# Patient Record
Sex: FEMALE | Race: WHITE | NOT HISPANIC OR LATINO
[De-identification: names, ages, dates, MRNs, and addresses within clinical notes are randomized per-mention and may not be internally consistent; named-entity substitution may affect disease eponyms.]

---

## 2017-01-05 ENCOUNTER — OTHER (OUTPATIENT)
Age: 57
End: 2017-01-05

## 2017-01-12 ENCOUNTER — MEDICATION RENEWAL (OUTPATIENT)
Age: 57
End: 2017-01-12

## 2017-03-07 ENCOUNTER — APPOINTMENT (OUTPATIENT)
Dept: ENDOCRINOLOGY | Facility: CLINIC | Age: 57
End: 2017-03-07

## 2017-03-07 VITALS — HEIGHT: 60.8 IN | BODY MASS INDEX: 27.35 KG/M2 | WEIGHT: 143 LBS

## 2017-03-07 VITALS — DIASTOLIC BLOOD PRESSURE: 76 MMHG | SYSTOLIC BLOOD PRESSURE: 110 MMHG | HEART RATE: 103 BPM | OXYGEN SATURATION: 98 %

## 2017-03-07 VITALS — HEART RATE: 92 BPM

## 2017-03-08 ENCOUNTER — MEDICATION RENEWAL (OUTPATIENT)
Age: 57
End: 2017-03-08

## 2017-03-16 ENCOUNTER — MEDICATION RENEWAL (OUTPATIENT)
Age: 57
End: 2017-03-16

## 2017-04-03 ENCOUNTER — APPOINTMENT (OUTPATIENT)
Dept: ENDOCRINOLOGY | Facility: CLINIC | Age: 57
End: 2017-04-03

## 2017-06-09 ENCOUNTER — APPOINTMENT (OUTPATIENT)
Dept: ENDOCRINOLOGY | Facility: CLINIC | Age: 57
End: 2017-06-09

## 2017-06-09 RX ORDER — DENOSUMAB 60 MG/ML
60 INJECTION SUBCUTANEOUS
Qty: 1 | Refills: 0 | Status: COMPLETED | OUTPATIENT
Start: 2017-06-09

## 2017-06-09 RX ADMIN — DENOSUMAB 0 MG/ML: 60 INJECTION SUBCUTANEOUS at 00:00

## 2017-06-12 LAB
ALBUMIN SERPL ELPH-MCNC: 4.5 G/DL
ALP BLD-CCNC: 90 U/L
ALT SERPL-CCNC: 32 U/L
ANION GAP SERPL CALC-SCNC: 16 MMOL/L
AST SERPL-CCNC: 14 U/L
BILIRUB SERPL-MCNC: 0.3 MG/DL
BUN SERPL-MCNC: 19 MG/DL
CALCIUM SERPL-MCNC: 9.9 MG/DL
CHLORIDE SERPL-SCNC: 105 MMOL/L
CO2 SERPL-SCNC: 23 MMOL/L
CREAT SERPL-MCNC: 0.89 MG/DL
GLUCOSE SERPL-MCNC: 92 MG/DL
POTASSIUM SERPL-SCNC: 3.9 MMOL/L
PROT SERPL-MCNC: 7 G/DL
SODIUM SERPL-SCNC: 144 MMOL/L

## 2017-06-13 ENCOUNTER — OUTPATIENT (OUTPATIENT)
Dept: OUTPATIENT SERVICES | Facility: HOSPITAL | Age: 57
LOS: 1 days | Discharge: HOME | End: 2017-06-13

## 2017-06-13 DIAGNOSIS — C50.912 MALIGNANT NEOPLASM OF UNSPECIFIED SITE OF LEFT FEMALE BREAST: ICD-10-CM

## 2017-06-14 ENCOUNTER — APPOINTMENT (OUTPATIENT)
Dept: HEMATOLOGY ONCOLOGY | Facility: CLINIC | Age: 57
End: 2017-06-14

## 2017-06-14 VITALS
WEIGHT: 145.48 LBS | SYSTOLIC BLOOD PRESSURE: 120 MMHG | HEART RATE: 88 BPM | BODY MASS INDEX: 27.67 KG/M2 | OXYGEN SATURATION: 97 % | RESPIRATION RATE: 16 BRPM | DIASTOLIC BLOOD PRESSURE: 80 MMHG | TEMPERATURE: 98.3 F

## 2017-06-14 DIAGNOSIS — Z13.71 ENCOUNTER FOR NONPROCREATIVE SCREENING FOR GENETIC DISEASE CARRIER STATUS: ICD-10-CM

## 2017-06-22 ENCOUNTER — APPOINTMENT (OUTPATIENT)
Dept: HEMATOLOGY ONCOLOGY | Facility: CLINIC | Age: 57
End: 2017-06-22

## 2017-10-19 ENCOUNTER — APPOINTMENT (OUTPATIENT)
Dept: OBGYN | Facility: CLINIC | Age: 57
End: 2017-10-19

## 2017-11-27 ENCOUNTER — MEDICATION RENEWAL (OUTPATIENT)
Age: 57
End: 2017-11-27

## 2017-12-11 ENCOUNTER — APPOINTMENT (OUTPATIENT)
Dept: ENDOCRINOLOGY | Facility: CLINIC | Age: 57
End: 2017-12-11
Payer: COMMERCIAL

## 2017-12-11 VITALS
BODY MASS INDEX: 28.5 KG/M2 | HEART RATE: 88 BPM | HEIGHT: 60.8 IN | SYSTOLIC BLOOD PRESSURE: 130 MMHG | WEIGHT: 149 LBS | DIASTOLIC BLOOD PRESSURE: 90 MMHG | OXYGEN SATURATION: 97 %

## 2017-12-11 PROCEDURE — 96372 THER/PROPH/DIAG INJ SC/IM: CPT

## 2017-12-11 PROCEDURE — 99214 OFFICE O/P EST MOD 30 MIN: CPT | Mod: 25

## 2017-12-11 RX ORDER — DENOSUMAB 60 MG/ML
60 INJECTION SUBCUTANEOUS
Qty: 0 | Refills: 0 | Status: COMPLETED | OUTPATIENT
Start: 2017-12-11

## 2017-12-11 RX ADMIN — DENOSUMAB 0 MG/ML: 60 INJECTION SUBCUTANEOUS at 00:00

## 2017-12-13 LAB
ALBUMIN SERPL ELPH-MCNC: 4.6 G/DL
ALP BLD-CCNC: 61 U/L
ALT SERPL-CCNC: 46 U/L
ANION GAP SERPL CALC-SCNC: 12 MMOL/L
AST SERPL-CCNC: 22 U/L
BILIRUB SERPL-MCNC: 0.3 MG/DL
BUN SERPL-MCNC: 13 MG/DL
CALCIUM SERPL-MCNC: 9.4 MG/DL
CHLORIDE SERPL-SCNC: 102 MMOL/L
CO2 SERPL-SCNC: 29 MMOL/L
CREAT SERPL-MCNC: 0.93 MG/DL
GLUCOSE SERPL-MCNC: 113 MG/DL
HBA1C MFR BLD HPLC: 5.9 %
POTASSIUM SERPL-SCNC: 4.5 MMOL/L
PROT SERPL-MCNC: 7 G/DL
SODIUM SERPL-SCNC: 143 MMOL/L

## 2017-12-14 ENCOUNTER — OUTPATIENT (OUTPATIENT)
Dept: OUTPATIENT SERVICES | Facility: HOSPITAL | Age: 57
LOS: 1 days | Discharge: HOME | End: 2017-12-14

## 2017-12-14 DIAGNOSIS — D05.12 INTRADUCTAL CARCINOMA IN SITU OF LEFT BREAST: ICD-10-CM

## 2017-12-14 DIAGNOSIS — M81.0 AGE-RELATED OSTEOPOROSIS WITHOUT CURRENT PATHOLOGICAL FRACTURE: ICD-10-CM

## 2017-12-14 DIAGNOSIS — C50.912 MALIGNANT NEOPLASM OF UNSPECIFIED SITE OF LEFT FEMALE BREAST: ICD-10-CM

## 2017-12-20 ENCOUNTER — RESULT REVIEW (OUTPATIENT)
Age: 57
End: 2017-12-20

## 2017-12-20 ENCOUNTER — APPOINTMENT (OUTPATIENT)
Dept: HEMATOLOGY ONCOLOGY | Facility: CLINIC | Age: 57
End: 2017-12-20
Payer: COMMERCIAL

## 2017-12-20 VITALS
DIASTOLIC BLOOD PRESSURE: 80 MMHG | HEART RATE: 83 BPM | OXYGEN SATURATION: 95 % | BODY MASS INDEX: 27.67 KG/M2 | RESPIRATION RATE: 16 BRPM | TEMPERATURE: 98.2 F | WEIGHT: 145.48 LBS | SYSTOLIC BLOOD PRESSURE: 120 MMHG

## 2017-12-20 LAB
HCT VFR BLD CALC: 39.7 % — SIGNIFICANT CHANGE UP (ref 34.5–45)
HGB BLD-MCNC: 14.1 G/DL — SIGNIFICANT CHANGE UP (ref 11.5–15.5)
MCHC RBC-ENTMCNC: 30.9 PG — SIGNIFICANT CHANGE UP (ref 27–34)
MCHC RBC-ENTMCNC: 35.5 G/DL — SIGNIFICANT CHANGE UP (ref 32–36)
MCV RBC AUTO: 87 FL — SIGNIFICANT CHANGE UP (ref 80–100)
PLATELET # BLD AUTO: 179 K/UL — SIGNIFICANT CHANGE UP (ref 150–400)
RBC # BLD: 4.56 M/UL — SIGNIFICANT CHANGE UP (ref 3.8–5.2)
RBC # FLD: 12 % — SIGNIFICANT CHANGE UP (ref 10.3–14.5)
WBC # BLD: 8 K/UL — SIGNIFICANT CHANGE UP (ref 3.8–10.5)
WBC # FLD AUTO: 8 K/UL — SIGNIFICANT CHANGE UP (ref 3.8–10.5)

## 2017-12-20 PROCEDURE — 99214 OFFICE O/P EST MOD 30 MIN: CPT

## 2017-12-21 LAB
25(OH)D3 SERPL-MCNC: 33.1 NG/ML
ALBUMIN SERPL ELPH-MCNC: 4.8 G/DL
ALP BLD-CCNC: 64 U/L
ALT SERPL-CCNC: 44 U/L
ANION GAP SERPL CALC-SCNC: 13 MMOL/L
AST SERPL-CCNC: 20 U/L
BILIRUB SERPL-MCNC: 0.3 MG/DL
BUN SERPL-MCNC: 17 MG/DL
CALCIUM SERPL-MCNC: 10 MG/DL
CHLORIDE SERPL-SCNC: 101 MMOL/L
CO2 SERPL-SCNC: 29 MMOL/L
CREAT SERPL-MCNC: 0.87 MG/DL
GLUCOSE SERPL-MCNC: 98 MG/DL
POTASSIUM SERPL-SCNC: 4.2 MMOL/L
PROT SERPL-MCNC: 7.1 G/DL
SODIUM SERPL-SCNC: 143 MMOL/L

## 2018-05-30 ENCOUNTER — MEDICATION RENEWAL (OUTPATIENT)
Age: 58
End: 2018-05-30

## 2018-06-18 ENCOUNTER — APPOINTMENT (OUTPATIENT)
Dept: ENDOCRINOLOGY | Facility: CLINIC | Age: 58
End: 2018-06-18
Payer: COMMERCIAL

## 2018-06-18 VITALS — HEIGHT: 60.8 IN | BODY MASS INDEX: 27.54 KG/M2 | WEIGHT: 144 LBS

## 2018-06-18 VITALS — OXYGEN SATURATION: 98 % | SYSTOLIC BLOOD PRESSURE: 118 MMHG | HEART RATE: 72 BPM | DIASTOLIC BLOOD PRESSURE: 76 MMHG

## 2018-06-18 PROCEDURE — 99214 OFFICE O/P EST MOD 30 MIN: CPT | Mod: 25

## 2018-06-18 PROCEDURE — 77080 DXA BONE DENSITY AXIAL: CPT

## 2018-06-18 PROCEDURE — 96372 THER/PROPH/DIAG INJ SC/IM: CPT

## 2018-06-18 RX ORDER — DENOSUMAB 60 MG/ML
60 INJECTION SUBCUTANEOUS
Qty: 0 | Refills: 0 | Status: COMPLETED | OUTPATIENT
Start: 2018-06-18

## 2018-06-18 RX ADMIN — DENOSUMAB 0 MG/ML: 60 INJECTION SUBCUTANEOUS at 00:00

## 2018-06-20 LAB
ALBUMIN SERPL ELPH-MCNC: 4.5 G/DL
ALP BLD-CCNC: 56 U/L
ALT SERPL-CCNC: 44 U/L
ANION GAP SERPL CALC-SCNC: 13 MMOL/L
AST SERPL-CCNC: 24 U/L
BILIRUB SERPL-MCNC: 0.4 MG/DL
BUN SERPL-MCNC: 19 MG/DL
CALCIUM SERPL-MCNC: 9.9 MG/DL
CHLORIDE SERPL-SCNC: 103 MMOL/L
CO2 SERPL-SCNC: 27 MMOL/L
CREAT SERPL-MCNC: 0.85 MG/DL
GLUCOSE SERPL-MCNC: 131 MG/DL
HBA1C MFR BLD HPLC: 5.9 %
POTASSIUM SERPL-SCNC: 4.3 MMOL/L
PROT SERPL-MCNC: 6.6 G/DL
SODIUM SERPL-SCNC: 143 MMOL/L

## 2018-06-26 ENCOUNTER — OUTPATIENT (OUTPATIENT)
Dept: OUTPATIENT SERVICES | Facility: HOSPITAL | Age: 58
LOS: 1 days | Discharge: HOME | End: 2018-06-26

## 2018-06-26 DIAGNOSIS — C50.912 MALIGNANT NEOPLASM OF UNSPECIFIED SITE OF LEFT FEMALE BREAST: ICD-10-CM

## 2018-06-26 DIAGNOSIS — D05.12 INTRADUCTAL CARCINOMA IN SITU OF LEFT BREAST: ICD-10-CM

## 2018-06-26 DIAGNOSIS — M81.0 AGE-RELATED OSTEOPOROSIS WITHOUT CURRENT PATHOLOGICAL FRACTURE: ICD-10-CM

## 2018-06-28 ENCOUNTER — APPOINTMENT (OUTPATIENT)
Dept: HEMATOLOGY ONCOLOGY | Facility: CLINIC | Age: 58
End: 2018-06-28
Payer: COMMERCIAL

## 2018-06-28 VITALS
DIASTOLIC BLOOD PRESSURE: 82 MMHG | HEART RATE: 78 BPM | OXYGEN SATURATION: 96 % | BODY MASS INDEX: 27.26 KG/M2 | SYSTOLIC BLOOD PRESSURE: 120 MMHG | RESPIRATION RATE: 16 BRPM | TEMPERATURE: 98 F | WEIGHT: 143.3 LBS

## 2018-06-28 DIAGNOSIS — H10.9 UNSPECIFIED CONJUNCTIVITIS: ICD-10-CM

## 2018-06-28 PROCEDURE — 99215 OFFICE O/P EST HI 40 MIN: CPT

## 2018-10-19 ENCOUNTER — CLINICAL ADVICE (OUTPATIENT)
Age: 58
End: 2018-10-19

## 2018-12-20 ENCOUNTER — MEDICATION RENEWAL (OUTPATIENT)
Age: 58
End: 2018-12-20

## 2018-12-24 ENCOUNTER — APPOINTMENT (OUTPATIENT)
Dept: OBGYN | Facility: CLINIC | Age: 58
End: 2018-12-24

## 2019-01-14 ENCOUNTER — APPOINTMENT (OUTPATIENT)
Dept: ENDOCRINOLOGY | Facility: CLINIC | Age: 59
End: 2019-01-14
Payer: COMMERCIAL

## 2019-01-14 VITALS
SYSTOLIC BLOOD PRESSURE: 130 MMHG | DIASTOLIC BLOOD PRESSURE: 80 MMHG | OXYGEN SATURATION: 98 % | WEIGHT: 141 LBS | BODY MASS INDEX: 26.97 KG/M2 | HEIGHT: 60.8 IN | HEART RATE: 90 BPM

## 2019-01-14 PROCEDURE — 99214 OFFICE O/P EST MOD 30 MIN: CPT | Mod: 25

## 2019-01-14 PROCEDURE — 96401 CHEMO ANTI-NEOPL SQ/IM: CPT

## 2019-01-14 RX ORDER — DENOSUMAB 60 MG/ML
60 INJECTION SUBCUTANEOUS
Qty: 1 | Refills: 0 | Status: COMPLETED | OUTPATIENT
Start: 2019-01-14

## 2019-01-14 RX ADMIN — DENOSUMAB 0 MG/ML: 60 INJECTION SUBCUTANEOUS at 00:00

## 2019-01-14 NOTE — ASSESSMENT
[Bisphosphonates] : The patient was instructed to take bisphosphonates on an empty stomach with a full glass of water,and wait at least 30 minutes before eating or lying down [FreeTextEntry1] : 58 year old woman on anastrozole with osteoporosis, treated with  bisphosphonates x 3 years, then switched to prolia 1.5 years ago ( decreased bone density at the hip on dxa prior to prolia)\par   - Pt with improved T score on last DXA\par   -continue prolia, administered today without complication [source: [facility]]\par   - continue vitamin D supplementation\par  - check cmp\par   - Repeat DXA in 1.5 years\par \par Prediabetes -  pt intends to start diet and exercise, will check hba1c next visit\par \par f/u  6 months

## 2019-01-14 NOTE — HISTORY OF PRESENT ILLNESS
[FreeTextEntry1] : cc: osteoporosis\par \par 58 year old woman with history of breast cancer, on anastrozole, with osteoporosis.  She had previously been treated with bisphosphonates for about 3 years, had difficulty with adherence, switched to prolia, first dose 1.5 years ago.  She takes supplemental vitamin D and calcium, consumes about 1 serving dairy daily .  She reports no recent falls or fractures.  She has regular dental f/u has decided to have a bridge instead of a dental implant, reports no thigh pain.\par \par She also has prediabetes,   has not been focusing on diet/exercise\par \par

## 2019-01-14 NOTE — PHYSICAL EXAM
[Alert] : alert [No Acute Distress] : no acute distress [Well Nourished] : well nourished [Well Developed] : well developed [Normal Sclera/Conjunctiva] : normal sclera/conjunctiva [No Proptosis] : no proptosis [Thyroid Not Enlarged] : the thyroid was not enlarged [No Respiratory Distress] : no respiratory distress [Clear to Auscultation] : lungs were clear to auscultation bilaterally [Normal S1, S2] : normal S1 and S2 [Regular Rhythm] : with a regular rhythm [No Edema] : there was no peripheral edema [Normal Bowel Sounds] : normal bowel sounds [Not Tender] : non-tender [Soft] : abdomen soft [No Spinal Tenderness] : no spinal tenderness [Normal Strength/Tone] : muscle strength and tone were normal [Normal Reflexes] : deep tendon reflexes were 2+ and symmetric [No Tremors] : no tremors [Normal Affect] : the affect was normal [Normal Mood] : the mood was normal

## 2019-01-16 LAB
ALBUMIN SERPL ELPH-MCNC: 4.7 G/DL
ALP BLD-CCNC: 61 U/L
ALT SERPL-CCNC: 32 U/L
ANION GAP SERPL CALC-SCNC: 10 MMOL/L
AST SERPL-CCNC: 19 U/L
BILIRUB SERPL-MCNC: 0.4 MG/DL
BUN SERPL-MCNC: 16 MG/DL
CALCIUM SERPL-MCNC: 10.2 MG/DL
CHLORIDE SERPL-SCNC: 104 MMOL/L
CO2 SERPL-SCNC: 28 MMOL/L
CREAT SERPL-MCNC: 0.87 MG/DL
GLUCOSE SERPL-MCNC: 101 MG/DL
POTASSIUM SERPL-SCNC: 4.6 MMOL/L
PROT SERPL-MCNC: 7.3 G/DL
SODIUM SERPL-SCNC: 142 MMOL/L

## 2019-01-17 ENCOUNTER — OUTPATIENT (OUTPATIENT)
Dept: OUTPATIENT SERVICES | Facility: HOSPITAL | Age: 59
LOS: 1 days | Discharge: HOME | End: 2019-01-17

## 2019-01-17 DIAGNOSIS — C50.912 MALIGNANT NEOPLASM OF UNSPECIFIED SITE OF LEFT FEMALE BREAST: ICD-10-CM

## 2019-01-17 DIAGNOSIS — M81.0 AGE-RELATED OSTEOPOROSIS WITHOUT CURRENT PATHOLOGICAL FRACTURE: ICD-10-CM

## 2019-01-24 ENCOUNTER — APPOINTMENT (OUTPATIENT)
Dept: HEMATOLOGY ONCOLOGY | Facility: CLINIC | Age: 59
End: 2019-01-24
Payer: COMMERCIAL

## 2019-01-24 VITALS
SYSTOLIC BLOOD PRESSURE: 132 MMHG | WEIGHT: 141.09 LBS | OXYGEN SATURATION: 100 % | BODY MASS INDEX: 26.84 KG/M2 | TEMPERATURE: 97.8 F | HEART RATE: 75 BPM | RESPIRATION RATE: 16 BRPM | DIASTOLIC BLOOD PRESSURE: 89 MMHG

## 2019-01-24 DIAGNOSIS — D12.6 BENIGN NEOPLASM OF COLON, UNSPECIFIED: ICD-10-CM

## 2019-01-24 PROCEDURE — 99215 OFFICE O/P EST HI 40 MIN: CPT

## 2019-01-24 RX ORDER — AZELAIC ACID 0.15 G/G
15 AEROSOL, FOAM TOPICAL
Refills: 0 | Status: DISCONTINUED | COMMUNITY
Start: 2018-06-28 | End: 2019-01-24

## 2019-01-24 NOTE — HISTORY OF PRESENT ILLNESS
[Disease: _____________________] : Disease: [unfilled] [T: ___] : T[unfilled] [N: ___] : N[unfilled] [AJCC Stage: ____] : AJCC Stage: [unfilled] [Treatment Protocol] : Treatment Protocol [Therapy: ___] : Therapy: [unfilled] [de-identified] : The patient presented in 2005, at the age of 45, with an abnormal screening mammogram.\par \par \par \par Mammotome biopsy on June 8, 2005 was positive for DCIS of the left breast.\par \par \par \par Dr. Melissa Agee performed a lumpectomy for DCIS intermediate nuclear grade with necrosis cribriform type. The DCIS was ER positive (90%) and [place] positive.\par \par \par \par The patient received radiation therapy at [facility] under the supervision of Dr. Acacia Isaac.\par \par \par \par The patient started tamoxifen in March 2006.\par \par \par \par In October 2009, while still on tamoxifen, the patient had an abnormal surveillance breast MRI n her rate which demonstrated an irregular mass in the contralateral (right) breast.\par \par \par \par The patient underwent lumpectomy and sentinel lymph node biopsy performed by Dr. Melissa Agee on November 19, 2009 which demonstrated an 8 mm infiltrating mammary carcinoma with ductal and lobular features. [place] lymph node was negative. The cancer was ER positive, [place] negative and HER-2/jose francisco negative by FISH. Oncotype DX recurrence score was 40.\par \par \par \par The patient received 4 cycles of adjuvant TC extending from January 8, 2010 through March 12, 2010.\par \par \par \par The patient subsequently received radiation therapy under the supervision of Dr. Ignacia Temple at [facility].\par \par \par \par The patient started anastrozole in July 2010, which she continues to the present time.\par \par \par \par Comprehensive BRACAnalysis performed on November 3, 2009 was negative for deleterious mutations. [de-identified] : Left breast DCIS solid and cribriform type with focal central necrosis ER positive [place] positive. Right breast infiltrating ductal and lobular carcinoma ER positive [place] negative HER-2/jose francisco negative Oncotype DX recurrence score 40 [FreeTextEntry1] : Anastrozole start 7/23/2010 [de-identified] : The patient has been 13  years  7 months  post diagnosis of Left breast DCIS and 9 years 1 month post diagnosis of Right breast  I DC.\par \par The patient completed chemotherapy 8  years 11   months ago.\par \par The patient completed 3 years 7 months of Tamoxifen \par \par The patient has been on anastrozole for 8   years 6 months, no side effects reported.\par \par Most recent breast MRI 1/22/2018.\par \par Most recent mammogram/breast US 5/11/2018.\par \par Last saw  breast surgeon Dr Palleschi 2/8/2018.\par \par Saw endocrinologist Dr Emi Corral, 01/14/2019, and received Prolia. BMD improved.\par \par Had gastroenteritis in 12/2019.\par \par Has ongoing rosacea, feels very subconscious about.\par \par No other interval events since last visit.

## 2019-01-24 NOTE — PHYSICAL EXAM
[Fully active, able to carry on all pre-disease performance without restriction] : Status 0 - Fully active, able to carry on all pre-disease performance without restriction [Normal] : PERRL, EOMI, no conjunctival infection, anicteric [de-identified] : Asymmetry, right breast larger than left.  right Breast: scars along lateral margin of areola and axilla, no masses.  Left breast; scar UOQ, mild hyperpigmentation, no mass. [de-identified] : Multiple pigmented nevi. Rosacea on nose.

## 2019-01-24 NOTE — REVIEW OF SYSTEMS
[Negative] : Allergic/Immunologic [Insomnia] : no insomnia [FreeTextEntry2] : Energy level WNL.  Declined flu vaccination. [FreeTextEntry7] : Had follow up colonoscopy 8/26/2016, 2 tubular adenomas found. Was told to repeat in 5 years. [FreeTextEntry8] : Most recent  GYN  exam  7/18/2016, no vaginal bleeding or spotting. [FreeTextEntry9] : Se interval history. [de-identified] : Occasional insomnia, takes Advil pm PRN.

## 2019-03-11 ENCOUNTER — APPOINTMENT (OUTPATIENT)
Dept: OBGYN | Facility: CLINIC | Age: 59
End: 2019-03-11
Payer: COMMERCIAL

## 2019-03-11 VITALS
SYSTOLIC BLOOD PRESSURE: 124 MMHG | HEIGHT: 61 IN | DIASTOLIC BLOOD PRESSURE: 82 MMHG | BODY MASS INDEX: 26.62 KG/M2 | WEIGHT: 141 LBS

## 2019-03-11 PROCEDURE — 99396 PREV VISIT EST AGE 40-64: CPT

## 2019-03-11 NOTE — CHIEF COMPLAINT
[Initial Visit] : initial GYN visit [FreeTextEntry1] : \par The patient started anastrozole in July 2010, which she continues to the present time.\par \par \par \par Comprehensive BRACAnalysis performed on November 3, 2009 was negative for deleterious mutations. \par \par Disease: Bilateral metzchronous breast cancer \par Pathology: Left breast DCIS solid and cribriform type with focal central necrosis ER positive [place] positive. Right breast infiltrating ductal and lobular carcinoma ER positive [place] negative HER-2/jose francisco negative Oncotype DX recurrence score 40 \par TNM stage: TLeft Tis, Right T1b, Nright N0 \par AJCC Stage: left: 0 Right: I \par Check up \par no gyn issues \par She had lumpectomies on both breasts. DCIS on left and stage 1 on the right \par \par

## 2019-03-13 LAB — HPV HIGH+LOW RISK DNA PNL CVX: NOT DETECTED

## 2019-03-15 LAB — CYTOLOGY CVX/VAG DOC THIN PREP: NORMAL

## 2019-07-15 ENCOUNTER — APPOINTMENT (OUTPATIENT)
Dept: ENDOCRINOLOGY | Facility: CLINIC | Age: 59
End: 2019-07-15
Payer: COMMERCIAL

## 2019-07-15 VITALS
SYSTOLIC BLOOD PRESSURE: 126 MMHG | BODY MASS INDEX: 26.81 KG/M2 | WEIGHT: 142 LBS | OXYGEN SATURATION: 96 % | HEIGHT: 61 IN | DIASTOLIC BLOOD PRESSURE: 80 MMHG | HEART RATE: 86 BPM

## 2019-07-15 PROCEDURE — 96401 CHEMO ANTI-NEOPL SQ/IM: CPT

## 2019-07-15 PROCEDURE — 99214 OFFICE O/P EST MOD 30 MIN: CPT | Mod: 25

## 2019-07-18 LAB
25(OH)D3 SERPL-MCNC: 26.4 NG/ML
ALBUMIN SERPL ELPH-MCNC: 4.8 G/DL
ALP BLD-CCNC: 54 U/L
ALT SERPL-CCNC: 21 U/L
ANION GAP SERPL CALC-SCNC: 14 MMOL/L
AST SERPL-CCNC: 15 U/L
BILIRUB SERPL-MCNC: 0.4 MG/DL
BUN SERPL-MCNC: 20 MG/DL
CALCIUM SERPL-MCNC: 9.8 MG/DL
CHLORIDE SERPL-SCNC: 103 MMOL/L
CO2 SERPL-SCNC: 26 MMOL/L
CREAT SERPL-MCNC: 0.78 MG/DL
ESTIMATED AVERAGE GLUCOSE: 123 MG/DL
GLUCOSE SERPL-MCNC: 101 MG/DL
HBA1C MFR BLD HPLC: 5.9 %
POTASSIUM SERPL-SCNC: 4.6 MMOL/L
PROT SERPL-MCNC: 7 G/DL
SODIUM SERPL-SCNC: 143 MMOL/L

## 2019-07-19 ENCOUNTER — CLINICAL ADVICE (OUTPATIENT)
Age: 59
End: 2019-07-19

## 2019-07-20 ENCOUNTER — OUTPATIENT (OUTPATIENT)
Dept: OUTPATIENT SERVICES | Facility: HOSPITAL | Age: 59
LOS: 1 days | Discharge: HOME | End: 2019-07-20

## 2019-07-20 DIAGNOSIS — C50.912 MALIGNANT NEOPLASM OF UNSPECIFIED SITE OF LEFT FEMALE BREAST: ICD-10-CM

## 2019-07-20 DIAGNOSIS — D05.12 INTRADUCTAL CARCINOMA IN SITU OF LEFT BREAST: ICD-10-CM

## 2019-07-20 DIAGNOSIS — M81.0 AGE-RELATED OSTEOPOROSIS WITHOUT CURRENT PATHOLOGICAL FRACTURE: ICD-10-CM

## 2019-07-25 ENCOUNTER — APPOINTMENT (OUTPATIENT)
Dept: HEMATOLOGY ONCOLOGY | Facility: CLINIC | Age: 59
End: 2019-07-25
Payer: COMMERCIAL

## 2019-07-25 VITALS
DIASTOLIC BLOOD PRESSURE: 78 MMHG | HEART RATE: 80 BPM | TEMPERATURE: 98.1 F | BODY MASS INDEX: 27.08 KG/M2 | WEIGHT: 143.3 LBS | OXYGEN SATURATION: 98 % | RESPIRATION RATE: 16 BRPM | SYSTOLIC BLOOD PRESSURE: 126 MMHG

## 2019-07-25 DIAGNOSIS — Z71.83 ENCOUNTER FOR NONPROCREATIVE GENETIC COUNSELING: ICD-10-CM

## 2019-07-25 PROCEDURE — 99215 OFFICE O/P EST HI 40 MIN: CPT

## 2019-07-25 NOTE — REVIEW OF SYSTEMS
[Negative] : Allergic/Immunologic [Insomnia] : no insomnia [FreeTextEntry2] : Energy level WNL.  Declined flu vaccination. [FreeTextEntry3] : Last eye exam with fall 2018,  benign findings [FreeTextEntry7] : Had follow up colonoscopy 8/26/2016, 2 tubular adenomas found. Was told to repeat in 5 years.Had gastroenteritis in 12/2018. Appetite is good. [FreeTextEntry8] : Most recent  GYN  exam  03/19/2019, exam OK.No vaginal bleeding or spotting. [de-identified] : \par Has ongoing rosacea, feels very subconscious about. [de-identified] : Occasional insomnia, takes Advil pm PRN.

## 2019-07-25 NOTE — PHYSICAL EXAM
[Fully active, able to carry on all pre-disease performance without restriction] : Status 0 - Fully active, able to carry on all pre-disease performance without restriction [Normal] : affect appropriate [de-identified] : Asymmetry, right breast larger than left.  right Breast: scars along lateral margin of areola and axilla, no masses.  Left breast; scar UOQ, mild hyperpigmentation, no mass. [de-identified] : Multiple pigmented nevi. Rosacea on nose.

## 2019-07-25 NOTE — HISTORY OF PRESENT ILLNESS
[Disease: _____________________] : Disease: [unfilled] [T: ___] : T[unfilled] [N: ___] : N[unfilled] [AJCC Stage: ____] : AJCC Stage: [unfilled] [Treatment Protocol] : Treatment Protocol [Therapy: ___] : Therapy: [unfilled] [de-identified] : The patient presented in 2005, at the age of 45, with an abnormal screening mammogram.\par \par \par \par Mammotome biopsy on June 8, 2005 was positive for DCIS of the left breast.\par \par \par \par Dr. Melissa Agee performed a lumpectomy for DCIS intermediate nuclear grade with necrosis cribriform type. The DCIS was ER positive (90%) and [place] positive.\par \par \par \par The patient received radiation therapy at [facility] under the supervision of Dr. Acacia Isaac.\par \par \par \par The patient started tamoxifen in March 2006.\par \par \par \par In October 2009, while still on tamoxifen, the patient had an abnormal surveillance breast MRI n her rate which demonstrated an irregular mass in the contralateral (right) breast.\par \par \par \par The patient underwent lumpectomy and sentinel lymph node biopsy performed by Dr. Melissa Agee on November 19, 2009 which demonstrated an 8 mm infiltrating mammary carcinoma with ductal and lobular features. [place] lymph node was negative. The cancer was ER positive, [place] negative and HER-2/jose francisco negative by FISH. Oncotype DX recurrence score was 40.\par \par \par \par The patient received 4 cycles of adjuvant TC extending from January 8, 2010 through March 12, 2010.\par \par \par \par The patient subsequently received radiation therapy under the supervision of Dr. Ignacia Temple at [facility].\par \par \par \par The patient started anastrozole in July 2010, which she continues to the present time.\par \par \par \par Comprehensive BRACAnalysis performed on November 3, 2009 was negative for deleterious mutations. [de-identified] : Left breast DCIS solid and cribriform type with focal central necrosis ER positive [place] positive. Right breast infiltrating ductal and lobular carcinoma ER positive [place] negative HER-2/jose francisco negative Oncotype DX recurrence score 40 [FreeTextEntry1] : Anastrozole start 7/23/2010 [de-identified] : The patient has been 14  years 5 months   post diagnosis of Left breast DCIS and 9 years 6  months  post diagnosis of Right breast  I DC.\par \par The patient completed chemotherapy 9   years 5  months ago.\par \par The patient completed 3 years 7 months of Tamoxifen \par \par The patient has been on anastrozole for 9  years, no side effects reported.\par \par Most recent breast MRI 1/22/2018, plan was to repeat in 18 months (now). Second cancer was seen on MRI only.\par \par Most recent mammogram/breast US 5/17/2019, BI RADS 2.\par \par Last saw  breast surgeon Dr Palleschi 2/8/2018.\par \par Saw endocrinologist Dr Emi Corral, 07/15/2019, and received Prolia. Most recent  07/15/2019 serum Vitamin D =26.4ng/ml on  was initiated on Vitamin D3 1000 iu.\par \par Has ongoing rosacea, feels very subconscious about.\par \par No other interval events since last visit.

## 2019-12-10 ENCOUNTER — RX RENEWAL (OUTPATIENT)
Age: 59
End: 2019-12-10

## 2020-01-31 ENCOUNTER — OUTPATIENT (OUTPATIENT)
Dept: OUTPATIENT SERVICES | Facility: HOSPITAL | Age: 60
LOS: 1 days | Discharge: HOME | End: 2020-01-31

## 2020-01-31 DIAGNOSIS — D05.12 INTRADUCTAL CARCINOMA IN SITU OF LEFT BREAST: ICD-10-CM

## 2020-01-31 DIAGNOSIS — M81.0 AGE-RELATED OSTEOPOROSIS WITHOUT CURRENT PATHOLOGICAL FRACTURE: ICD-10-CM

## 2020-01-31 DIAGNOSIS — C50.912 MALIGNANT NEOPLASM OF UNSPECIFIED SITE OF LEFT FEMALE BREAST: ICD-10-CM

## 2020-02-06 ENCOUNTER — APPOINTMENT (OUTPATIENT)
Dept: HEMATOLOGY ONCOLOGY | Facility: CLINIC | Age: 60
End: 2020-02-06
Payer: COMMERCIAL

## 2020-02-06 ENCOUNTER — RESULT REVIEW (OUTPATIENT)
Age: 60
End: 2020-02-06

## 2020-02-06 VITALS
OXYGEN SATURATION: 97 % | BODY MASS INDEX: 27.49 KG/M2 | HEART RATE: 76 BPM | TEMPERATURE: 97.5 F | SYSTOLIC BLOOD PRESSURE: 133 MMHG | WEIGHT: 145.51 LBS | DIASTOLIC BLOOD PRESSURE: 78 MMHG | RESPIRATION RATE: 17 BRPM

## 2020-02-06 DIAGNOSIS — R73.09 OTHER ABNORMAL GLUCOSE: ICD-10-CM

## 2020-02-06 LAB
BASOPHILS # BLD AUTO: 0.1 K/UL — SIGNIFICANT CHANGE UP (ref 0–0.2)
BASOPHILS NFR BLD AUTO: 0.8 % — SIGNIFICANT CHANGE UP (ref 0–2)
EOSINOPHIL # BLD AUTO: 0.2 K/UL — SIGNIFICANT CHANGE UP (ref 0–0.5)
EOSINOPHIL NFR BLD AUTO: 2 % — SIGNIFICANT CHANGE UP (ref 0–6)
HCT VFR BLD CALC: 42.7 % — SIGNIFICANT CHANGE UP (ref 34.5–45)
HGB BLD-MCNC: 14.7 G/DL — SIGNIFICANT CHANGE UP (ref 11.5–15.5)
LYMPHOCYTES # BLD AUTO: 2 K/UL — SIGNIFICANT CHANGE UP (ref 1–3.3)
LYMPHOCYTES # BLD AUTO: 25.6 % — SIGNIFICANT CHANGE UP (ref 13–44)
MCHC RBC-ENTMCNC: 30.9 PG — SIGNIFICANT CHANGE UP (ref 27–34)
MCHC RBC-ENTMCNC: 34.4 G/DL — SIGNIFICANT CHANGE UP (ref 32–36)
MCV RBC AUTO: 89.9 FL — SIGNIFICANT CHANGE UP (ref 80–100)
MONOCYTES # BLD AUTO: 0.6 K/UL — SIGNIFICANT CHANGE UP (ref 0–0.9)
MONOCYTES NFR BLD AUTO: 8.1 % — SIGNIFICANT CHANGE UP (ref 2–14)
NEUTROPHILS # BLD AUTO: 4.9 K/UL — SIGNIFICANT CHANGE UP (ref 1.8–7.4)
NEUTROPHILS NFR BLD AUTO: 63.5 % — SIGNIFICANT CHANGE UP (ref 43–77)
PLATELET # BLD AUTO: 182 K/UL — SIGNIFICANT CHANGE UP (ref 150–400)
RBC # BLD: 4.76 M/UL — SIGNIFICANT CHANGE UP (ref 3.8–5.2)
RBC # FLD: 12.2 % — SIGNIFICANT CHANGE UP (ref 10.3–14.5)
WBC # BLD: 7.6 K/UL — SIGNIFICANT CHANGE UP (ref 3.8–10.5)
WBC # FLD AUTO: 7.6 K/UL — SIGNIFICANT CHANGE UP (ref 3.8–10.5)

## 2020-02-06 PROCEDURE — 99214 OFFICE O/P EST MOD 30 MIN: CPT

## 2020-02-06 RX ORDER — ELECTROLYTES/DEXTROSE
SOLUTION, ORAL ORAL
Refills: 0 | Status: DISCONTINUED | COMMUNITY
Start: 2019-01-24 | End: 2020-02-06

## 2020-02-06 RX ORDER — DENOSUMAB 60 MG/ML
60 INJECTION SUBCUTANEOUS
Qty: 1 | Refills: 1 | Status: DISCONTINUED | COMMUNITY
Start: 2017-03-08 | End: 2020-02-06

## 2020-02-07 LAB
ALBUMIN SERPL ELPH-MCNC: 4.8 G/DL
ALP BLD-CCNC: 56 U/L
ALT SERPL-CCNC: 36 U/L
ANION GAP SERPL CALC-SCNC: 11 MMOL/L
AST SERPL-CCNC: 19 U/L
BILIRUB SERPL-MCNC: 0.4 MG/DL
BUN SERPL-MCNC: 15 MG/DL
CALCIUM SERPL-MCNC: 10 MG/DL
CHLORIDE SERPL-SCNC: 103 MMOL/L
CHOLEST SERPL-MCNC: 259 MG/DL
CHOLEST/HDLC SERPL: 3.2 RATIO
CO2 SERPL-SCNC: 29 MMOL/L
CREAT SERPL-MCNC: 0.8 MG/DL
ESTIMATED AVERAGE GLUCOSE: 123 MG/DL
GLUCOSE SERPL-MCNC: 105 MG/DL
HBA1C MFR BLD HPLC: 5.9 %
HDLC SERPL-MCNC: 82 MG/DL
LDLC SERPL CALC-MCNC: 160 MG/DL
POTASSIUM SERPL-SCNC: 4.8 MMOL/L
PROT SERPL-MCNC: 6.9 G/DL
SODIUM SERPL-SCNC: 143 MMOL/L
TRIGL SERPL-MCNC: 90 MG/DL

## 2020-02-07 NOTE — PHYSICAL EXAM
[de-identified] : Asymmetry, right breast larger than left.  right Breast: scars along lateral margin of areola and axilla, no masses.  Left breast; scar UOQ, mild hyperpigmentation, no mass. [de-identified] : Multiple pigmented nevi.

## 2020-02-07 NOTE — REVIEW OF SYSTEMS
[Insomnia] : no insomnia [FreeTextEntry2] : Energy level WNL.  Had flu vaccination fall 2019. [FreeTextEntry7] : Had follow up colonoscopy 8/26/2016, 2 tubular adenomas found. Was told to repeat in 5 years. [FreeTextEntry8] : Most recent  GYN  exam  03/19/2019, exam OK.No vaginal bleeding or spotting. [de-identified] : \par Has ongoing rosacea, feels very subconscious about. [de-identified] : Insomnia, difficulty falling asleep and staying asleep.

## 2020-02-07 NOTE — HISTORY OF PRESENT ILLNESS
[de-identified] : The patient presented in 2005, at the age of 45, with an abnormal screening mammogram.\par  Mammotome biopsy on June 8, 2005 was positive for DCIS of the left breast.\par \par \par \par Dr. Melissa Agee performed a lumpectomy for DCIS intermediate nuclear grade with necrosis cribriform type. The DCIS was ER positive (90%) and [place] positive.\par \par \par \par The patient received radiation therapy at [facility] under the supervision of Dr. Acacia Isaac.\par \par \par \par The patient started tamoxifen in March 2006.\par \par \par \par In October 2009, while still on tamoxifen, the patient had an abnormal surveillance breast MRI n her rate which demonstrated an irregular mass in the contralateral (right) breast.\par \par \par \par The patient underwent lumpectomy and sentinel lymph node biopsy performed by Dr. Melissa Agee on November 19, 2009 which demonstrated an 8 mm infiltrating mammary carcinoma with ductal and lobular features. [place] lymph node was negative. The cancer was ER positive, [place] negative and HER-2/jose francisco negative by FISH. Oncotype DX recurrence score was 40.\par \par \par \par The patient received 4 cycles of adjuvant TC extending from January 8, 2010 through March 12, 2010.\par \par \par \par The patient subsequently received radiation therapy under the supervision of Dr. Ignacia Temple at [facility].\par \par \par \par The patient started anastrozole in July 2010, which she continues to the present time.\par \par \par \par Comprehensive BRACAnalysis performed on November 3, 2009 was negative for deleterious mutations. [de-identified] : Left breast DCIS solid and cribriform type with focal central necrosis ER positive [place] positive. Right breast infiltrating ductal and lobular carcinoma ER positive [place] negative HER-2/jose francisco negative Oncotype DX recurrence score 40 [FreeTextEntry1] : Anastrozole start 7/23/2010 [de-identified] : The patient has been 14  years 11  months   post diagnosis of Left breast DCIS and 9 years 11 months  post diagnosis of Right breast  I DC.\par \par The patient completed chemotherapy 9  years 11  months ago.\par \par The patient completed 3 years 7 months of Tamoxifen \par \par The patient has been on anastrozole for 9  years 7 months, no side effects reported.\par \par Most recent breast MRI 08/23/2019, BI RADS. Second cancer was seen on MRI only.\par \par Most recent mammogram/breast US 5/17/2019, BI RADS 2.\par \par Last saw  breast surgeon Dr Palleschi 2/8/2018.\par \par Saw endocrinologist Dr Emi Corral, 07/15/2019, and received Prolia. Most recent  07/15/2019 serum Vitamin D =26.4ng/ml on  was initiated on Vitamin D3 1000 iu.\par \par Improved  rosacea since stopping using makeup.\par \par No other interval events since last visit.

## 2020-02-12 ENCOUNTER — APPOINTMENT (OUTPATIENT)
Dept: HEMATOLOGY ONCOLOGY | Facility: CLINIC | Age: 60
End: 2020-02-12

## 2020-02-28 ENCOUNTER — APPOINTMENT (OUTPATIENT)
Dept: HEMATOLOGY ONCOLOGY | Facility: CLINIC | Age: 60
End: 2020-02-28

## 2020-03-05 ENCOUNTER — TRANSCRIPTION ENCOUNTER (OUTPATIENT)
Age: 60
End: 2020-03-05

## 2020-04-20 ENCOUNTER — APPOINTMENT (OUTPATIENT)
Dept: ENDOCRINOLOGY | Facility: CLINIC | Age: 60
End: 2020-04-20
Payer: COMMERCIAL

## 2020-04-20 VITALS
SYSTOLIC BLOOD PRESSURE: 110 MMHG | DIASTOLIC BLOOD PRESSURE: 70 MMHG | HEART RATE: 87 BPM | WEIGHT: 145 LBS | OXYGEN SATURATION: 98 % | BODY MASS INDEX: 27.38 KG/M2 | HEIGHT: 61 IN

## 2020-04-20 PROCEDURE — 99214 OFFICE O/P EST MOD 30 MIN: CPT | Mod: 25

## 2020-04-20 PROCEDURE — 96401 CHEMO ANTI-NEOPL SQ/IM: CPT

## 2020-04-20 RX ORDER — DENOSUMAB 60 MG/ML
60 INJECTION SUBCUTANEOUS
Qty: 1 | Refills: 0 | Status: COMPLETED | OUTPATIENT
Start: 2020-04-20

## 2020-04-20 RX ADMIN — DENOSUMAB 0 MG/ML: 60 INJECTION SUBCUTANEOUS at 00:00

## 2020-04-20 NOTE — HISTORY OF PRESENT ILLNESS
[FreeTextEntry1] : cc: osteoporosis\par \par 59 year old woman with history of breast cancer, on anastrozole, with osteoporosis.  Plan is to stop anastrozole in July.  Previously treated with bisphosphonates for about 3 years, had difficulty with adherence, switched to prolia, first dose 6/2017 (missed last dose).    She reports no recent falls or fractures.  She has regular dental f/u  reports no thigh pain.  She takes supplemental vitamin D, consumes about 0-1 serving dairy daily .\par \par She also has prediabetes,  weight has been stable.\par \par

## 2020-04-20 NOTE — ASSESSMENT
[FreeTextEntry1] : 59 year old woman on anastrozole with osteoporosis, treated with  bisphosphonates x 3 years, then switched to prolia 6/2017 ( decreased bone density at the hip on dxa prior to prolia), last dose 7/2019\par \par   -continue prolia, administered today without complication [source: [facility]]\par   - continue vitamin D supplementation\par   - Missed previous dose of prolia, Repeat DXA in one year\par \par Prediabetes -  hba1c has been statble, encouraged pt to continue with diet and exercise, \par \par Vit D def'y - continue vitamin D\par \par f/u  6 months [Denosumab Therapy] : Risks  and benefits of denosumab therapy were discussed with the patient including eczema, cellulitis, osteonecrosis of the jaw and atypical femur fractures

## 2020-04-20 NOTE — PHYSICAL EXAM
[Alert] : alert [No Acute Distress] : no acute distress [Normal Sclera/Conjunctiva] : normal sclera/conjunctiva [EOMI] : extra ocular movement intact [No Respiratory Distress] : no respiratory distress [Thyroid Not Enlarged] : the thyroid was not enlarged [Clear to Auscultation] : lungs were clear to auscultation bilaterally [Regular Rhythm] : with a regular rhythm [Normal S1, S2] : normal S1 and S2 [Normal Bowel Sounds] : normal bowel sounds [Not Tender] : non-tender [Soft] : abdomen soft [Normal Strength/Tone] : muscle strength and tone were normal [No Rash] : no rash [No Tremors] : no tremors [Normal Affect] : the affect was normal [Normal Mood] : the mood was normal [Kyphosis] : no kyphosis present [Scoliosis] : no scoliosis

## 2020-04-20 NOTE — REVIEW OF SYSTEMS
[Stress] : stress [Chest Pain] : no chest pain [Shortness Of Breath] : no shortness of breath [Nausea] : no nausea

## 2020-08-15 ENCOUNTER — OUTPATIENT (OUTPATIENT)
Dept: OUTPATIENT SERVICES | Facility: HOSPITAL | Age: 60
LOS: 1 days | Discharge: HOME | End: 2020-08-15

## 2020-08-15 DIAGNOSIS — C50.912 MALIGNANT NEOPLASM OF UNSPECIFIED SITE OF LEFT FEMALE BREAST: ICD-10-CM

## 2020-08-15 DIAGNOSIS — D05.12 INTRADUCTAL CARCINOMA IN SITU OF LEFT BREAST: ICD-10-CM

## 2020-08-15 DIAGNOSIS — M81.0 AGE-RELATED OSTEOPOROSIS WITHOUT CURRENT PATHOLOGICAL FRACTURE: ICD-10-CM

## 2020-10-21 ENCOUNTER — OUTPATIENT (OUTPATIENT)
Dept: OUTPATIENT SERVICES | Facility: HOSPITAL | Age: 60
LOS: 1 days | Discharge: HOME | End: 2020-10-21

## 2020-10-21 DIAGNOSIS — C50.912 MALIGNANT NEOPLASM OF UNSPECIFIED SITE OF LEFT FEMALE BREAST: ICD-10-CM

## 2020-10-26 ENCOUNTER — APPOINTMENT (OUTPATIENT)
Dept: ENDOCRINOLOGY | Facility: CLINIC | Age: 60
End: 2020-10-26
Payer: COMMERCIAL

## 2020-10-26 VITALS
HEIGHT: 61 IN | BODY MASS INDEX: 27.94 KG/M2 | OXYGEN SATURATION: 98 % | DIASTOLIC BLOOD PRESSURE: 72 MMHG | WEIGHT: 148 LBS | TEMPERATURE: 97.6 F | SYSTOLIC BLOOD PRESSURE: 120 MMHG | HEART RATE: 71 BPM

## 2020-10-26 PROCEDURE — 96401 CHEMO ANTI-NEOPL SQ/IM: CPT | Mod: GC

## 2020-10-26 PROCEDURE — 99072 ADDL SUPL MATRL&STAF TM PHE: CPT | Mod: GC

## 2020-10-26 PROCEDURE — 99214 OFFICE O/P EST MOD 30 MIN: CPT | Mod: 25,GC

## 2020-10-26 RX ORDER — DENOSUMAB 60 MG/ML
60 INJECTION SUBCUTANEOUS
Qty: 1 | Refills: 0 | Status: COMPLETED | OUTPATIENT
Start: 2020-10-26

## 2020-10-26 RX ADMIN — DENOSUMAB 0 MG/ML: 60 INJECTION SUBCUTANEOUS at 00:00

## 2020-10-27 ENCOUNTER — APPOINTMENT (OUTPATIENT)
Dept: HEMATOLOGY ONCOLOGY | Facility: CLINIC | Age: 60
End: 2020-10-27

## 2020-10-27 LAB
ALBUMIN SERPL ELPH-MCNC: 5 G/DL
ALP BLD-CCNC: 64 U/L
ALT SERPL-CCNC: 33 U/L
ANION GAP SERPL CALC-SCNC: 13 MMOL/L
AST SERPL-CCNC: 17 U/L
BILIRUB SERPL-MCNC: 0.4 MG/DL
BUN SERPL-MCNC: 15 MG/DL
CALCIUM SERPL-MCNC: 10 MG/DL
CHLORIDE SERPL-SCNC: 104 MMOL/L
CO2 SERPL-SCNC: 27 MMOL/L
CREAT SERPL-MCNC: 0.83 MG/DL
ESTIMATED AVERAGE GLUCOSE: 126 MG/DL
GLUCOSE SERPL-MCNC: 115 MG/DL
HBA1C MFR BLD HPLC: 6 %
POTASSIUM SERPL-SCNC: 4.8 MMOL/L
PROT SERPL-MCNC: 6.7 G/DL
SODIUM SERPL-SCNC: 143 MMOL/L

## 2020-10-27 NOTE — END OF VISIT
[] : Fellow [FreeTextEntry3] : Pt with osteoporosis and vitamin D deficiency, now off anastrozole, treated with bisphosphonate x 3 years, then prolia since 6/2017.\par  - COntinue prolia\par  - check dxa in 6 months\par  - check cmp\par  - resume calcium and vitamin D \par \par Also with prediabetes, with some weight gain in setting of covid.  Check hba1c, Counseled on diet and exercise

## 2020-10-27 NOTE — ASSESSMENT
[Denosumab Therapy] : Risks  and benefits of denosumab therapy were discussed with the patient including eczema, cellulitis, osteonecrosis of the jaw and atypical femur fractures [FreeTextEntry1] : 60 year old woman on anastrozole with osteoporosis, treated with  bisphosphonates x 3 years, then switched to prolia 6/2017 ( decreased bone density at the hip on dxa prior to prolia), last dose 4/2020.\par  - now off anastrozole\par   - continue prolia, administered today without complication [source: [facility]]\par   - Recommend restart calcium and vitamin D supplementation\par   - Check DXA in 6 months\par  - Check cmp\par \par Prediabetes -  Encouraged pt to monitor diet and increase exercise.  \par  - Check hba1c\par \par Vit D def'y - Resume vitamin D3 supplementation. Will check Vit D level at next visit \par \par RTC in  6 months\par \par Patient already received 2020 Flu shot\par \par seen and discussed with Dr Corral\par \par Bruno Stephens MD\par Endocrine Fellow

## 2020-10-27 NOTE — REVIEW OF SYSTEMS
[Stress] : stress [Fatigue] : no fatigue [Recent Weight Loss (___ Lbs)] : no recent weight loss [Blurred Vision] : no blurred vision [Chest Pain] : no chest pain [Shortness Of Breath] : no shortness of breath [Nausea] : no nausea [Constipation] : no constipation [Vomiting] : no vomiting [Diarrhea] : no diarrhea

## 2020-10-27 NOTE — HISTORY OF PRESENT ILLNESS
[FreeTextEntry1] : cc: osteoporosis\par \par 60 year old woman with history of breast cancer, previously on anastrozole, with osteoporosis.  Anastrozole complete in July 2020. Previously treated with bisphosphonates for about 3 years, had difficulty with adherence, switched to Prolia, first dose 6/2017, Last dose 4/2020. She reports no recent falls or fractures. Patient usually has regular dental follow up however has not been recently due to pandemic. Reports no thigh pain.  She has not been adherent to Calcium and Vit D supplement since last visit, consumes about 0-1 serving dairy daily .\par \par She also has prediabetes,  weight slightly increased. Patient reports inactivity due to pandemic. \par \par

## 2020-10-27 NOTE — PHYSICAL EXAM
[Alert] : alert [No Acute Distress] : no acute distress [Normal Sclera/Conjunctiva] : normal sclera/conjunctiva [EOMI] : extra ocular movement intact [Thyroid Not Enlarged] : the thyroid was not enlarged [No Respiratory Distress] : no respiratory distress [Clear to Auscultation] : lungs were clear to auscultation bilaterally [Normal S1, S2] : normal S1 and S2 [Regular Rhythm] : with a regular rhythm [Normal Bowel Sounds] : normal bowel sounds [Not Tender] : non-tender [Soft] : abdomen soft [Normal Strength/Tone] : muscle strength and tone were normal [No Rash] : no rash [No Tremors] : no tremors [Normal Affect] : the affect was normal [Normal Mood] : the mood was normal [Kyphosis] : no kyphosis present [Scoliosis] : no scoliosis

## 2020-10-28 ENCOUNTER — NON-APPOINTMENT (OUTPATIENT)
Age: 60
End: 2020-10-28

## 2021-01-30 ENCOUNTER — OUTPATIENT (OUTPATIENT)
Dept: OUTPATIENT SERVICES | Facility: HOSPITAL | Age: 61
LOS: 1 days | Discharge: HOME | End: 2021-01-30

## 2021-01-30 DIAGNOSIS — C50.912 MALIGNANT NEOPLASM OF UNSPECIFIED SITE OF LEFT FEMALE BREAST: ICD-10-CM

## 2021-02-04 ENCOUNTER — APPOINTMENT (OUTPATIENT)
Dept: HEMATOLOGY ONCOLOGY | Facility: CLINIC | Age: 61
End: 2021-02-04

## 2021-03-02 ENCOUNTER — OUTPATIENT (OUTPATIENT)
Dept: OUTPATIENT SERVICES | Facility: HOSPITAL | Age: 61
LOS: 1 days | Discharge: HOME | End: 2021-03-02

## 2021-03-02 DIAGNOSIS — C50.912 MALIGNANT NEOPLASM OF UNSPECIFIED SITE OF LEFT FEMALE BREAST: ICD-10-CM

## 2021-03-05 ENCOUNTER — APPOINTMENT (OUTPATIENT)
Dept: HEMATOLOGY ONCOLOGY | Facility: CLINIC | Age: 61
End: 2021-03-05
Payer: COMMERCIAL

## 2021-03-05 VITALS
OXYGEN SATURATION: 98 % | SYSTOLIC BLOOD PRESSURE: 134 MMHG | HEIGHT: 61.02 IN | HEART RATE: 92 BPM | TEMPERATURE: 98 F | RESPIRATION RATE: 16 BRPM | DIASTOLIC BLOOD PRESSURE: 87 MMHG | BODY MASS INDEX: 28.22 KG/M2 | WEIGHT: 149.47 LBS

## 2021-03-05 PROCEDURE — 99214 OFFICE O/P EST MOD 30 MIN: CPT

## 2021-03-05 PROCEDURE — 99072 ADDL SUPL MATRL&STAF TM PHE: CPT

## 2021-03-05 RX ORDER — GLUCOSAMINE HCL/CHONDROITIN SU 500-400 MG
3 CAPSULE ORAL
Refills: 0 | Status: DISCONTINUED | COMMUNITY
End: 2021-03-05

## 2021-03-08 ENCOUNTER — NON-APPOINTMENT (OUTPATIENT)
Age: 61
End: 2021-03-08

## 2021-03-08 NOTE — HISTORY OF PRESENT ILLNESS
[Disease: _____________________] : Disease: [unfilled] [T: ___] : T[unfilled] [N: ___] : N[unfilled] [AJCC Stage: ____] : AJCC Stage: [unfilled] [Treatment Protocol] : Treatment Protocol [Therapy: ___] : Therapy: [unfilled] [de-identified] : The patient presented in 2005, at the age of 45, with an abnormal screening mammogram.  Mammotome biopsy on June 8, 2005 was positive for DCIS of the left breast.  Dr. Melissa Agee performed a lumpectomy for DCIS intermediate nuclear grade with necrosis cribriform type. The DCIS was ER positive (90%) and [place] positive.  The patient received radiation therapy at [facility] under the supervision of Dr. Acacia Isaac.  The patient started tamoxifen in March 2006.\par \par In October 2009, while still on tamoxifen, the patient had an abnormal surveillance breast MRI n her rate which demonstrated an irregular mass in the contralateral (right) breast.  The patient underwent lumpectomy and sentinel lymph node biopsy performed by Dr. Melissa Agee on November 19, 2009 which demonstrated an 8 mm infiltrating mammary carcinoma with ductal and lobular features. [place] lymph node was negative. The cancer was ER positive, [place] negative and HER-2/jose francisco negative by FISH. Oncotype DX recurrence score was 40.  The patient received 4 cycles of adjuvant TC extending from January 8, 2010 through March 12, 2010.  The patient subsequently received radiation therapy under the supervision of Dr. Ignacia Temple at [facility].  \par \par The patient started anastrozole in July 2010, completed 10 years in 7/2020.\par \par Comprehensive BRACAnalysis performed on November 3, 2009 was negative for deleterious mutations. [de-identified] : Left breast DCIS solid and cribriform type with focal central necrosis ER positive [place] positive. Right breast infiltrating ductal and lobular carcinoma ER positive [place] negative HER-2/jose francisco negative Oncotype DX recurrence score 40 [FreeTextEntry1] : Anastrozole 7/23/2010 - 7/2020 [de-identified] : \par Transfer of care from Dr. Karina Basurto. \par Completed 10 years anastrazole. \par Most recent breast MRI 08/23/2019, BI RADS. Second cancer was seen on MRI only.\par Most recent mammogram/breast US 8/26/2020, BI RADS 2.  Right 10:00 scar and scar at the left 2:00.\par Last saw breast surgeon Dr Palleschi 2/8/2018, stopped going.\par Saw endocrinologist Dr Emi Corral, 10/26/2020, seeing in 5/2021, receiving Prolia.\par Requesting referral to nutritionist, may get exercise bike.\par COVID vaccine 1st dose 3/2 Pfizer.\par Grieving over sudden death of cat. \par Labs recently done at Dr. Corral's office.\par No other interval events since last visit.

## 2021-03-08 NOTE — PHYSICAL EXAM
[Fully active, able to carry on all pre-disease performance without restriction] : Status 0 - Fully active, able to carry on all pre-disease performance without restriction [Normal] : affect appropriate [de-identified] : Asymmetry, right breast larger than left.  right Breast: scars along lateral margin of areola and axilla, no masses.  Left breast; scar UOQ, mild hyperpigmentation, no mass. [de-identified] : Multiple pigmented nevi.

## 2021-03-08 NOTE — REVIEW OF SYSTEMS
[Negative] : Allergic/Immunologic [Insomnia] : no insomnia [FreeTextEntry2] : Energy level WNL.  Had flu vaccination fall 2019. PCP Dr. Charles Hernandez, has not seen in a while. [FreeTextEntry3] : eye exam 2019, no cataracts [FreeTextEntry7] : Had follow up colonoscopy 8/26/2016, 2 tubular adenomas found. Was told to repeat in 5 years. [FreeTextEntry8] : Most recent GYN exam  03/19/2019, exam OK.No vaginal bleeding or spotting. [de-identified] : Improved rosacea since stopping using makeup.\par Has ongoing rosacea, feels very subconscious about. [de-identified] : Insomnia, difficulty falling asleep and staying asleep.

## 2021-03-17 ENCOUNTER — RX RENEWAL (OUTPATIENT)
Age: 61
End: 2021-03-17

## 2021-04-13 ENCOUNTER — APPOINTMENT (OUTPATIENT)
Dept: HEMATOLOGY ONCOLOGY | Facility: CLINIC | Age: 61
End: 2021-04-13

## 2021-05-03 ENCOUNTER — APPOINTMENT (OUTPATIENT)
Dept: ENDOCRINOLOGY | Facility: CLINIC | Age: 61
End: 2021-05-03
Payer: COMMERCIAL

## 2021-05-03 VITALS — HEIGHT: 60.8 IN | BODY MASS INDEX: 27.35 KG/M2 | TEMPERATURE: 98 F | WEIGHT: 143 LBS

## 2021-05-03 VITALS — SYSTOLIC BLOOD PRESSURE: 120 MMHG | HEART RATE: 83 BPM | OXYGEN SATURATION: 98 % | DIASTOLIC BLOOD PRESSURE: 80 MMHG

## 2021-05-03 PROCEDURE — 77080 DXA BONE DENSITY AXIAL: CPT

## 2021-05-03 PROCEDURE — ZZZZZ: CPT

## 2021-05-03 PROCEDURE — 99214 OFFICE O/P EST MOD 30 MIN: CPT | Mod: 25

## 2021-05-03 PROCEDURE — 96401 CHEMO ANTI-NEOPL SQ/IM: CPT

## 2021-05-03 RX ORDER — DENOSUMAB 60 MG/ML
60 INJECTION SUBCUTANEOUS
Qty: 1 | Refills: 0 | Status: COMPLETED | OUTPATIENT
Start: 2021-05-03

## 2021-05-03 RX ADMIN — DENOSUMAB 0 MG/ML: 60 INJECTION SUBCUTANEOUS at 00:00

## 2021-05-03 NOTE — PHYSICAL EXAM
[Alert] : alert [Healthy Appearance] : healthy appearance [No Acute Distress] : no acute distress [Normal Sclera/Conjunctiva] : normal sclera/conjunctiva [No Proptosis] : no proptosis [No LAD] : no lymphadenopathy [Thyroid Not Enlarged] : the thyroid was not enlarged [No Respiratory Distress] : no respiratory distress [Clear to Auscultation] : lungs were clear to auscultation bilaterally [Normal S1, S2] : normal S1 and S2 [Regular Rhythm] : with a regular rhythm [No Edema] : no peripheral edema [Normal Bowel Sounds] : normal bowel sounds [Not Tender] : non-tender [Soft] : abdomen soft [No Spinal Tenderness] : no spinal tenderness [No Involuntary Movements] : no involuntary movements were seen [Normal Strength/Tone] : muscle strength and tone were normal [Normal Reflexes] : deep tendon reflexes were 2+ and symmetric [No Tremors] : no tremors [Normal Affect] : the affect was normal [Normal Mood] : the mood was normal [Kyphosis] : no kyphosis present

## 2021-05-03 NOTE — ASSESSMENT
[FreeTextEntry1] : 60 year old woman on previously on anastrozole with osteoporosis, treated with  bisphosphonates x 3 years, then switched to prolia 6/2017\par  - DXA performed today, pt with improved bone density in hip and spine\par   - continue prolia, administered today without complication [source: [facility]]\par   - Recommend calcium 5-600 mg daily\par   - Repeat DXA in 2 years\par  - Check cmp\par \par Prediabetes -  Encouraged pt to  increase exercise as planned.  \par  - Check hba1c\par \par Vit D def'y -Check Vit D level, resume supplementation if low.\par \par f/u in  6 months\par \par  [Denosumab Therapy] : Risks  and benefits of denosumab therapy were discussed with the patient including eczema, cellulitis, osteonecrosis of the jaw and atypical femur fractures

## 2021-05-03 NOTE — HISTORY OF PRESENT ILLNESS
[FreeTextEntry1] : cc: osteoporosis\par \par 60 year old woman with history of breast cancer, previously on anastrozole, with osteoporosis.  Anastrozole complete in July 2020. She was  treated with bisphosphonates in the past for about 3 years, had difficulty with adherence, switched to Prolia, first dose 6/2017. . She reports no recent falls or fractures. She has regular dental follow up, reports no thigh pain.  She consumes about 1 serving dairy daily, has not been taking supplemental calcium, vitamin D .\par \par Also with prediabetes- has not been exercising, but recently doing a lot of gardening and plans to start walking outside\par \par had covid vaccine, pfizer\par \par \par covid vaccine\par

## 2021-05-09 LAB
25(OH)D3 SERPL-MCNC: 40.1 NG/ML
ALBUMIN SERPL ELPH-MCNC: 4.7 G/DL
ALP BLD-CCNC: 60 U/L
ALT SERPL-CCNC: 30 U/L
ANION GAP SERPL CALC-SCNC: 12 MMOL/L
AST SERPL-CCNC: 14 U/L
BILIRUB SERPL-MCNC: 0.4 MG/DL
BUN SERPL-MCNC: 14 MG/DL
CALCIUM SERPL-MCNC: 10.1 MG/DL
CHLORIDE SERPL-SCNC: 103 MMOL/L
CO2 SERPL-SCNC: 27 MMOL/L
CREAT SERPL-MCNC: 0.86 MG/DL
ESTIMATED AVERAGE GLUCOSE: 123 MG/DL
GLUCOSE SERPL-MCNC: 109 MG/DL
HBA1C MFR BLD HPLC: 5.9 %
POTASSIUM SERPL-SCNC: 4.7 MMOL/L
PROT SERPL-MCNC: 6.9 G/DL
SODIUM SERPL-SCNC: 142 MMOL/L

## 2021-05-24 ENCOUNTER — APPOINTMENT (OUTPATIENT)
Dept: OBGYN | Facility: CLINIC | Age: 61
End: 2021-05-24
Payer: COMMERCIAL

## 2021-05-24 VITALS — DIASTOLIC BLOOD PRESSURE: 68 MMHG | SYSTOLIC BLOOD PRESSURE: 140 MMHG

## 2021-05-24 VITALS — WEIGHT: 145 LBS | TEMPERATURE: 96.8 F | BODY MASS INDEX: 27.58 KG/M2

## 2021-05-24 DIAGNOSIS — Z01.419 ENCOUNTER FOR GYNECOLOGICAL EXAMINATION (GENERAL) (ROUTINE) W/OUT ABNORMAL FINDINGS: ICD-10-CM

## 2021-05-24 PROCEDURE — 99396 PREV VISIT EST AGE 40-64: CPT

## 2021-05-24 NOTE — HISTORY OF PRESENT ILLNESS
[Patient reported mammogram was normal] : Patient reported mammogram was normal [Patient reported breast sonogram was normal] : Patient reported breast sonogram was normal [Patient reported PAP Smear was normal] : Patient reported PAP Smear was normal [FreeTextEntry1] : omprehensive BRACAnalysis performed on November 3, 2009 was negative for deleterious mutations. 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 Right breast infiltrating ductal and lobular carcinoma ER positive VA negative HER-2/jose francisco negative Oncotype DX recurrence score 40 \par TNM stage: TLeft Tis, Right T1b, Nright N0 \par AJCC Stage: left: 0 Right: I \par Check up \par no gyn issues \par Patient is off anastrozole now  [Mammogramdate] : 2020 [BreastSonogramDate] : 2020 [BoneDensityDate] : 2021 [PapSmeardate] : 2019 [TextBox_37] : osteopenia  [ColonoscopyDate] : UTD

## 2021-05-24 NOTE — CHIEF COMPLAINT
[Initial Visit] : initial GYN visit [FreeTextEntry1] : \par The patient started anastrozole in July 2010, which she continues to the present time.\par \par \par \par Comprehensive BRACAnalysis performed on November 3, 2009 was negative for deleterious mutations. 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 Right breast infiltrating ductal and lobular carcinoma ER positive NH negative HER-2/jose francisco negative Oncotype DX recurrence score 40 \par TNM stage: TLeft Tis, Right T1b, Nright N0 \par AJCC Stage: left: 0 Right: I \par Check up \par no gyn issues \par She had lumpectomies on both breasts. DCIS on left and stage 1 on the right \par \par

## 2021-05-25 LAB — HPV HIGH+LOW RISK DNA PNL CVX: NOT DETECTED

## 2021-06-01 LAB — CYTOLOGY CVX/VAG DOC THIN PREP: ABNORMAL

## 2021-07-15 ENCOUNTER — NON-APPOINTMENT (OUTPATIENT)
Age: 61
End: 2021-07-15

## 2021-08-12 ENCOUNTER — OUTPATIENT (OUTPATIENT)
Dept: OUTPATIENT SERVICES | Facility: HOSPITAL | Age: 61
LOS: 1 days | Discharge: HOME | End: 2021-08-12

## 2021-08-12 DIAGNOSIS — C50.912 MALIGNANT NEOPLASM OF UNSPECIFIED SITE OF LEFT FEMALE BREAST: ICD-10-CM

## 2021-08-31 ENCOUNTER — APPOINTMENT (OUTPATIENT)
Dept: HEMATOLOGY ONCOLOGY | Facility: CLINIC | Age: 61
End: 2021-08-31
Payer: COMMERCIAL

## 2021-08-31 PROCEDURE — 99214 OFFICE O/P EST MOD 30 MIN: CPT | Mod: 95

## 2021-09-12 NOTE — REVIEW OF SYSTEMS
[Insomnia] : no insomnia [Negative] : Psychiatric [de-identified] : \par Has ongoing rosacea, feels very subconscious about.

## 2021-09-12 NOTE — ASSESSMENT
[FreeTextEntry1] : Infiltrating duct and lobular carcinoma of right breast \par  · Mixed infiltrating ductal and lobular carcinoma right breast \par     Oncotype DX recurrence score 40\par     Status post lumpectomy/sentinel lymph node biopsy\par     Status post TC x4\par     Status post RT\par     Completed anastrozole tx 10 years \par     CARLOS\par     up tp date w mammo/US \par     Will continue annual surveillance with MRI because this cancer was found on MRI only. \par Intraductal carcinoma in situ of left breast \par  · DCIS left breast\par     Status post lumpectomy\par     Status post RT\par     Status post tamoxifen x3 years 9 months.\par     Status post anastrozole x 10 years for a contralateral invasive breast cancer.\par     CARLOS\par     Labs reviewed from 5/2021\par

## 2021-09-12 NOTE — HISTORY OF PRESENT ILLNESS
[Home] : at home, [unfilled] , at the time of the visit. [Medical Office: ([facility])___] : at the medical office located in  [Verbal consent obtained from patient] : the patient, [unfilled] [Disease: _____________________] : Disease: [unfilled] [T: ___] : T[unfilled] [N: ___] : N[unfilled] [AJCC Stage: ____] : AJCC Stage: [unfilled] [Treatment Protocol] : Treatment Protocol [Therapy: ___] : Therapy: [unfilled] [de-identified] : The patient presented in 2005, at the age of 45, with an abnormal screening mammogram.  Mammotome biopsy on June 8, 2005 was positive for DCIS of the left breast.  Dr. Melissa Agee performed a lumpectomy for DCIS intermediate nuclear grade with necrosis cribriform type. The DCIS was ER positive (90%) and TX positive.  The patient received radiation therapy at [facility] under the supervision of Dr. Acacia Isaac.  The patient started tamoxifen in March 2006.\par \par In October 2009, while still on tamoxifen, the patient had an abnormal surveillance breast MRI which demonstrated an irregular mass in the contralateral (right) breast.  \par \par The patient underwent lumpectomy and sentinel lymph node biopsy performed by Dr. Melissa Agee on November 19, 2009 which demonstrated an 8 mm infiltrating mammary carcinoma with ductal and lobular features. [place] lymph node was negative. The cancer was ER positive, TX negative and HER-2/jose francisco negative by FISH. \par \par Oncotype DX recurrence score was 40.  \par \par The patient received 4 cycles of adjuvant TC extending from January 8, 2010 through March 12, 2010.  \par \par The patient subsequently received radiation therapy under the supervision of Dr. Ignacia Temple at [facility].  \par \par The patient started anastrozole in July 2010, completed 10 years in 7/2020.\par \par Comprehensive BRACAnalysis performed on November 3, 2009 was negative for deleterious mutations. 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 Right breast infiltrating ductal and lobular carcinoma ER positive TN negative HER-2/jose francisco negative Oncotype DX recurrence score 40 [FreeTextEntry1] : Anastrozole 7/23/2010 - 7/2020 [de-identified] : \par Completed 10 years anastrazole 7/2020. On active surveillance.\par Second cancer was seen on MRI only. Most recent breast MRI 3/19/21, BI RADS. \par Most recent mammogram/breast US 8/26/2021, BI RADS 2. report pending\par Last saw breast surgeon Dr Palleschi 2/8/2018, not following any further.\par Labs recently done at Dr. Corral's office.\par \par HEALTH MAINTENANCE\par PCP Dr. Charles Hernandez, has not seen in a while.\par eye exam 2019, no cataracts\par Had follow up colonoscopy 8/26/2016, 2 tubular adenomas found. Was told to repeat in 5 years.\par Most recent GYN exam  5/24/21, exam OK.No vaginal bleeding or spotting.\par Saw endocrinologist Dr Emi Corral 8/31/21, receiving Prolia. DEXA 3/2021. Pre-DM.\par Requesting referral to nutritionist, may get exercise bike.\par COVID vaccine 1st dose 3/2 Pfizer.\par Grieving over sudden death of cat. \par No other interval events since last visit.

## 2021-10-06 ENCOUNTER — OUTPATIENT (OUTPATIENT)
Dept: OUTPATIENT SERVICES | Facility: HOSPITAL | Age: 61
LOS: 1 days | Discharge: HOME | End: 2021-10-06

## 2021-10-06 DIAGNOSIS — C50.912 MALIGNANT NEOPLASM OF UNSPECIFIED SITE OF LEFT FEMALE BREAST: ICD-10-CM

## 2021-10-08 ENCOUNTER — RESULT REVIEW (OUTPATIENT)
Age: 61
End: 2021-10-08

## 2021-10-08 ENCOUNTER — APPOINTMENT (OUTPATIENT)
Dept: HEMATOLOGY ONCOLOGY | Facility: CLINIC | Age: 61
End: 2021-10-08
Payer: COMMERCIAL

## 2021-10-08 VITALS
WEIGHT: 145.48 LBS | DIASTOLIC BLOOD PRESSURE: 83 MMHG | BODY MASS INDEX: 27.47 KG/M2 | SYSTOLIC BLOOD PRESSURE: 142 MMHG | HEART RATE: 83 BPM | RESPIRATION RATE: 16 BRPM | OXYGEN SATURATION: 99 % | HEIGHT: 61 IN | TEMPERATURE: 97.3 F

## 2021-10-08 DIAGNOSIS — R92.2 INCONCLUSIVE MAMMOGRAM: ICD-10-CM

## 2021-10-08 DIAGNOSIS — D05.12 INTRADUCTAL CARCINOMA IN SITU OF LEFT BREAST: ICD-10-CM

## 2021-10-08 LAB
BASOPHILS # BLD AUTO: 0.05 K/UL — SIGNIFICANT CHANGE UP (ref 0–0.2)
BASOPHILS NFR BLD AUTO: 0.6 % — SIGNIFICANT CHANGE UP (ref 0–2)
EOSINOPHIL # BLD AUTO: 0.18 K/UL — SIGNIFICANT CHANGE UP (ref 0–0.5)
EOSINOPHIL NFR BLD AUTO: 2.3 % — SIGNIFICANT CHANGE UP (ref 0–6)
HCT VFR BLD CALC: 41.6 % — SIGNIFICANT CHANGE UP (ref 34.5–45)
HGB BLD-MCNC: 14.4 G/DL — SIGNIFICANT CHANGE UP (ref 11.5–15.5)
IMM GRANULOCYTES NFR BLD AUTO: 1.1 % — SIGNIFICANT CHANGE UP (ref 0–1.5)
LYMPHOCYTES # BLD AUTO: 1.94 K/UL — SIGNIFICANT CHANGE UP (ref 1–3.3)
LYMPHOCYTES # BLD AUTO: 24.7 % — SIGNIFICANT CHANGE UP (ref 13–44)
MCHC RBC-ENTMCNC: 29.8 PG — SIGNIFICANT CHANGE UP (ref 27–34)
MCHC RBC-ENTMCNC: 34.6 G/DL — SIGNIFICANT CHANGE UP (ref 32–36)
MCV RBC AUTO: 86 FL — SIGNIFICANT CHANGE UP (ref 80–100)
MONOCYTES # BLD AUTO: 0.67 K/UL — SIGNIFICANT CHANGE UP (ref 0–0.9)
MONOCYTES NFR BLD AUTO: 8.5 % — SIGNIFICANT CHANGE UP (ref 2–14)
NEUTROPHILS # BLD AUTO: 4.92 K/UL — SIGNIFICANT CHANGE UP (ref 1.8–7.4)
NEUTROPHILS NFR BLD AUTO: 62.8 % — SIGNIFICANT CHANGE UP (ref 43–77)
NRBC # BLD: 0 /100 WBCS — SIGNIFICANT CHANGE UP (ref 0–0)
PLATELET # BLD AUTO: 189 K/UL — SIGNIFICANT CHANGE UP (ref 150–400)
RBC # BLD: 4.84 M/UL — SIGNIFICANT CHANGE UP (ref 3.8–5.2)
RBC # FLD: 12.6 % — SIGNIFICANT CHANGE UP (ref 10.3–14.5)
WBC # BLD: 7.85 K/UL — SIGNIFICANT CHANGE UP (ref 3.8–10.5)
WBC # FLD AUTO: 7.85 K/UL — SIGNIFICANT CHANGE UP (ref 3.8–10.5)

## 2021-10-08 PROCEDURE — 99214 OFFICE O/P EST MOD 30 MIN: CPT

## 2021-10-20 NOTE — REVIEW OF SYSTEMS
[Negative] : Allergic/Immunologic [Insomnia] : no insomnia [de-identified] : \par Has ongoing rosacea, feels very subconscious about.

## 2021-10-20 NOTE — ASSESSMENT
[FreeTextEntry1] : Infiltrating duct and lobular carcinoma of right breast \par  · Mixed infiltrating ductal and lobular carcinoma right breast \par     Oncotype DX recurrence score 40\par     Status post lumpectomy/sentinel lymph node biopsy\par     Status post TC x4\par     Status post RT\par     Completed anastrozole tx 10 years \par     CARLOS\par     up tp date w mammo/US - reviewed results with pt\par     MRI yearly, reviewed recent MRI\par     Will continue annual surveillance with MRI because this cancer was found on MRI only. \par Intraductal carcinoma in situ of left breast \par     DCIS left breast\par     Status post lumpectomy\par     Status post RT\par     Status post tamoxifen x3 years 9 months.\par     Status post anastrozole x 10 years for a contralateral invasive breast cancer.\par     CARLOS\par     Labs reviewed from 5/2021\par

## 2021-10-20 NOTE — HISTORY OF PRESENT ILLNESS
[Disease: _____________________] : Disease: [unfilled] [T: ___] : T[unfilled] [N: ___] : N[unfilled] [AJCC Stage: ____] : AJCC Stage: [unfilled] [Treatment Protocol] : Treatment Protocol [Therapy: ___] : Therapy: [unfilled] [de-identified] : The patient presented in 2005, at the age of 45, with an abnormal screening mammogram.  Mammotome biopsy on June 8, 2005 was positive for DCIS of the left breast.  Dr. Melissa Agee performed a lumpectomy for DCIS intermediate nuclear grade with necrosis cribriform type. The DCIS was ER positive (90%) and FL positive.  The patient received radiation therapy at [facility] under the supervision of Dr. Acacia Isaac.  The patient started tamoxifen in March 2006.\par \par In October 2009, while still on tamoxifen, the patient had an abnormal surveillance breast MRI which demonstrated an irregular mass in the contralateral (right) breast.  \par \par The patient underwent lumpectomy and sentinel lymph node biopsy performed by Dr. Melissa Agee on November 19, 2009 which demonstrated an 8 mm infiltrating mammary carcinoma with ductal and lobular features. [place] lymph node was negative. The cancer was ER positive, FL negative and HER-2/jose francisco negative by FISH. \par \par Oncotype DX recurrence score was 40.  \par \par The patient received 4 cycles of adjuvant TC extending from January 8, 2010 through March 12, 2010.  \par \par The patient subsequently received radiation therapy under the supervision of Dr. Ignacia Temple at [facility]  \par \par The patient started anastrozole in July 2010, completed 10 years in 7/2020.\par \par Comprehensive BRACAnalysis performed on November 3, 2009 was negative for deleterious mutations. 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 Right breast infiltrating ductal and lobular carcinoma ER positive AK negative HER-2/jose francisco negative Oncotype DX recurrence score 40 [FreeTextEntry1] : Anastrozole 7/23/2010 - 7/2020 [de-identified] : \par Completed 10 years anastrazole 7/2020. On active surveillance.\par Second cancer was seen on MRI only. \par breast MRI 3/19/21, BI RADS3, 0.4cm rim enhancing mass within superficial lower outer quadrant of right breast , suggestive of fat necrosis, rec targeted US, in absence of sonographic correlate, should be designated as a probable benign finding and rec fu breast MRI rec in 6mos.; prominent left axillary LN likely due m recent COVID vaccination, targeted b/l axillary US rec in 3 mos in 6/20/21 (was not done)\par mammogram/breast US 8/27/2021, BI RADS 2, no evidence of malignancy\par MRI 9/30/21 showed no suspicious MRI findings in either breast, previous lesion in lower outer breast not seen on this exam, resolution of left axillary LAD, return to annual screening, BIRADS2.\par Last saw breast surgeon Dr Palleschi 2/8/2018, not following any further.\par Labs recently done at Dr. Corral's office.\par \par HEALTH MAINTENANCE\par PCP Dr. Charles Hernandez, has not seen in a while.\par eye exam 2019, no cataracts\par Had follow up colonoscopy 8/26/2016, 2 tubular adenomas found. Was told to repeat in 5 years.\par Most recent GYN exam  5/24/21, exam OK.No vaginal bleeding or spotting.\par Saw endocrinologist Dr Emi Corral 8/31/21, receiving Prolia. DEXA 3/2021. Pre-DM.\par NEURO left 5th pinky toe tingling intermittent, not worsening\par Requesting referral to nutritionist, may get exercise bike.\par COVID vaccine 1st dose 3/2 Pfizer.\par Grieving over sudden death of cat. \par Restarting yoga for exercise, gardening now

## 2021-10-20 NOTE — PHYSICAL EXAM
[Fully active, able to carry on all pre-disease performance without restriction] : Status 0 - Fully active, able to carry on all pre-disease performance without restriction [Normal] : affect appropriate [de-identified] : Asymmetry, right breast larger than left. right Breast: scars along lateral margin of areola and axilla, no masses. Left breast; scar UOQ, mild hyperpigmentation, no mass.

## 2021-11-08 ENCOUNTER — APPOINTMENT (OUTPATIENT)
Dept: ENDOCRINOLOGY | Facility: CLINIC | Age: 61
End: 2021-11-08
Payer: COMMERCIAL

## 2021-11-08 VITALS
DIASTOLIC BLOOD PRESSURE: 80 MMHG | OXYGEN SATURATION: 99 % | HEART RATE: 84 BPM | WEIGHT: 141 LBS | BODY MASS INDEX: 26.62 KG/M2 | TEMPERATURE: 97.9 F | HEIGHT: 61 IN | SYSTOLIC BLOOD PRESSURE: 120 MMHG

## 2021-11-08 PROCEDURE — 99214 OFFICE O/P EST MOD 30 MIN: CPT | Mod: 25

## 2021-11-08 PROCEDURE — 96401 CHEMO ANTI-NEOPL SQ/IM: CPT

## 2021-11-08 RX ORDER — DENOSUMAB 60 MG/ML
60 INJECTION SUBCUTANEOUS
Qty: 1 | Refills: 0 | Status: COMPLETED | OUTPATIENT
Start: 2021-11-08

## 2021-11-08 RX ADMIN — DENOSUMAB 0 MG/ML: 60 INJECTION SUBCUTANEOUS at 00:00

## 2021-11-08 NOTE — HISTORY OF PRESENT ILLNESS
[FreeTextEntry1] : cc: osteoporosis\par \par 61 year old woman with history of breast cancer, previously on anastrozole, with osteoporosis.  Anastrozole completed in July 2020. She was  treated with bisphosphonates in the past for about 3 years, had difficulty with adherence, switched to Prolia, first dose was 6/2017.  She has regular dental follow up, reports no thigh pain.  She consumes about 1 serving dairy daily, has not been taking supplemental calcium.  She reports no recent falls or fractures. \par \par Also with prediabetes- weight has been stable.  Trying to decrease portion size\par \par had [employer], [employer], will be getting booster\par \par \par \par

## 2021-11-08 NOTE — ASSESSMENT
[FreeTextEntry1] : 61 year old woman on previously on anastrozole with osteoporosis, treated with  bisphosphonates x 3 years, then switched to prolia 6/2017\par  - DXA last visit with improved bone density in hip and spine\par   - continue prolia, administered today without complication [source: [facility]]\par   - Recommend calcium 5-600 mg daily\par   - Repeat DXA in 1.5 years\par  - Check cmp\par \par Prediabetes -  Encouraged pt to  increase exercise as planned.  \par  - Check hba1c\par \par Vit D def'y -replete on last blood tests\par \par f/u in  6 months\par \par  [Denosumab Therapy] : Risks  and benefits of denosumab therapy were discussed with the patient including eczema, cellulitis, osteonecrosis of the jaw and atypical femur fractures

## 2021-11-08 NOTE — REVIEW OF SYSTEMS
Walk in [Chest Pain] : no chest pain [Shortness Of Breath] : no shortness of breath [Nausea] : no nausea

## 2021-11-12 ENCOUNTER — NON-APPOINTMENT (OUTPATIENT)
Age: 61
End: 2021-11-12

## 2021-11-12 LAB
ALBUMIN SERPL ELPH-MCNC: 4.8 G/DL
ALP BLD-CCNC: 58 U/L
ALT SERPL-CCNC: 17 U/L
ANION GAP SERPL CALC-SCNC: 14 MMOL/L
AST SERPL-CCNC: 13 U/L
BILIRUB SERPL-MCNC: 0.4 MG/DL
BUN SERPL-MCNC: 17 MG/DL
CALCIUM SERPL-MCNC: 9.9 MG/DL
CHLORIDE SERPL-SCNC: 102 MMOL/L
CO2 SERPL-SCNC: 26 MMOL/L
CREAT SERPL-MCNC: 0.82 MG/DL
ESTIMATED AVERAGE GLUCOSE: 120 MG/DL
GLUCOSE SERPL-MCNC: 102 MG/DL
HBA1C MFR BLD HPLC: 5.8 %
POTASSIUM SERPL-SCNC: 4.2 MMOL/L
PROT SERPL-MCNC: 6.9 G/DL
SODIUM SERPL-SCNC: 143 MMOL/L

## 2022-02-18 ENCOUNTER — NON-APPOINTMENT (OUTPATIENT)
Age: 62
End: 2022-02-18

## 2022-02-18 ENCOUNTER — APPOINTMENT (OUTPATIENT)
Dept: OPHTHALMOLOGY | Facility: CLINIC | Age: 62
End: 2022-02-18
Payer: COMMERCIAL

## 2022-02-18 PROCEDURE — 92015 DETERMINE REFRACTIVE STATE: CPT

## 2022-02-18 PROCEDURE — 92004 COMPRE OPH EXAM NEW PT 1/>: CPT

## 2022-04-18 ENCOUNTER — RX RENEWAL (OUTPATIENT)
Age: 62
End: 2022-04-18

## 2022-04-25 ENCOUNTER — OUTPATIENT (OUTPATIENT)
Dept: OUTPATIENT SERVICES | Facility: HOSPITAL | Age: 62
LOS: 1 days | Discharge: HOME | End: 2022-04-25

## 2022-04-25 DIAGNOSIS — C50.912 MALIGNANT NEOPLASM OF UNSPECIFIED SITE OF LEFT FEMALE BREAST: ICD-10-CM

## 2022-05-06 ENCOUNTER — APPOINTMENT (OUTPATIENT)
Dept: HEMATOLOGY ONCOLOGY | Facility: CLINIC | Age: 62
End: 2022-05-06

## 2022-05-09 ENCOUNTER — APPOINTMENT (OUTPATIENT)
Dept: ENDOCRINOLOGY | Facility: CLINIC | Age: 62
End: 2022-05-09
Payer: COMMERCIAL

## 2022-05-09 VITALS
OXYGEN SATURATION: 99 % | TEMPERATURE: 97.2 F | HEART RATE: 90 BPM | BODY MASS INDEX: 28.47 KG/M2 | HEIGHT: 60 IN | DIASTOLIC BLOOD PRESSURE: 76 MMHG | WEIGHT: 145 LBS | SYSTOLIC BLOOD PRESSURE: 118 MMHG

## 2022-05-09 DIAGNOSIS — E55.9 VITAMIN D DEFICIENCY, UNSPECIFIED: ICD-10-CM

## 2022-05-09 PROCEDURE — 96401 CHEMO ANTI-NEOPL SQ/IM: CPT

## 2022-05-09 PROCEDURE — 99214 OFFICE O/P EST MOD 30 MIN: CPT | Mod: 25

## 2022-05-09 RX ORDER — DENOSUMAB 60 MG/ML
60 INJECTION SUBCUTANEOUS
Qty: 1 | Refills: 0 | Status: COMPLETED | OUTPATIENT
Start: 2022-05-09

## 2022-05-09 RX ADMIN — DENOSUMAB 0 MG/ML: 60 INJECTION SUBCUTANEOUS at 00:00

## 2022-05-09 NOTE — PHYSICAL EXAM
[Alert] : alert [Healthy Appearance] : healthy appearance [No Acute Distress] : no acute distress [Normal Sclera/Conjunctiva] : normal sclera/conjunctiva [No Proptosis] : no proptosis [No LAD] : no lymphadenopathy [Thyroid Not Enlarged] : the thyroid was not enlarged [No Respiratory Distress] : no respiratory distress [Clear to Auscultation] : lungs were clear to auscultation bilaterally [Normal S1, S2] : normal S1 and S2 [Regular Rhythm] : with a regular rhythm [No Edema] : no peripheral edema [Normal Bowel Sounds] : normal bowel sounds [Not Tender] : non-tender [Soft] : abdomen soft [No Spinal Tenderness] : no spinal tenderness [Kyphosis] : no kyphosis present [No Involuntary Movements] : no involuntary movements were seen [Normal Strength/Tone] : muscle strength and tone were normal [Normal Reflexes] : deep tendon reflexes were 2+ and symmetric [No Tremors] : no tremors [Normal Affect] : the affect was normal [Normal Mood] : the mood was normal

## 2022-05-09 NOTE — HISTORY OF PRESENT ILLNESS
[FreeTextEntry1] : cc: osteoporosis\par \par 61 year old woman with history of breast cancer, previously on anastrozole, with osteoporosis.  Anastrozole completed in July 2020. She was  treated with bisphosphonates in the past for about 3 years, had difficulty with adherence, switched to Prolia, first dose was 6/2017.  She reports no recent falls or fractures.   She consumes about 1 serving dairy daily, has not been taking supplemental calcium.  She has regular dental follow up, reports no thigh pain.\par \par She has had shoulder pain since the winter time, after shoveling and has recently had back pain after gardening.\par \par Also with prediabetes-  not exercising much, thinking of retiring soon and will then increase exercise\par \par had covid vaccine, pfizer, and booster\par \par \par \par

## 2022-05-09 NOTE — ASSESSMENT
[Denosumab Therapy] : Risks  and benefits of denosumab therapy were discussed with the patient including eczema, cellulitis, osteonecrosis of the jaw and atypical femur fractures [FreeTextEntry1] : 61 year old woman on previously on anastrozole with osteoporosis, treated with  bisphosphonates x 3 years, then switched to prolia 6/2017\par  - last DXA with improved bone density in hip and spine\par   - continue prolia, administered today without complication [source: [facility]]\par   - Recommend calcium 5-600 mg daily\par   - Repeat DXA in 1 year\par  - Check cmp\par \par Prediabetes -  Encouraged pt to  increase exercise \par  - Check hba1c\par \par Vit D def'y -replete on last blood tests\par \par f/u in  6 months\par \par

## 2022-05-20 LAB
ALBUMIN SERPL ELPH-MCNC: 4.8 G/DL
ALP BLD-CCNC: 61 U/L
ALT SERPL-CCNC: 35 U/L
ANION GAP SERPL CALC-SCNC: 11 MMOL/L
AST SERPL-CCNC: 15 U/L
BILIRUB SERPL-MCNC: 0.3 MG/DL
BUN SERPL-MCNC: 17 MG/DL
CALCIUM SERPL-MCNC: 10.4 MG/DL
CHLORIDE SERPL-SCNC: 105 MMOL/L
CO2 SERPL-SCNC: 28 MMOL/L
CREAT SERPL-MCNC: 0.76 MG/DL
EGFR: 88 ML/MIN/1.73M2
ESTIMATED AVERAGE GLUCOSE: 131 MG/DL
GLUCOSE SERPL-MCNC: 96 MG/DL
HBA1C MFR BLD HPLC: 6.2 %
POTASSIUM SERPL-SCNC: 4.6 MMOL/L
PROT SERPL-MCNC: 6.8 G/DL
SODIUM SERPL-SCNC: 143 MMOL/L

## 2022-05-24 ENCOUNTER — OUTPATIENT (OUTPATIENT)
Dept: OUTPATIENT SERVICES | Facility: HOSPITAL | Age: 62
LOS: 1 days | Discharge: HOME | End: 2022-05-24

## 2022-05-24 DIAGNOSIS — C50.912 MALIGNANT NEOPLASM OF UNSPECIFIED SITE OF LEFT FEMALE BREAST: ICD-10-CM

## 2022-05-27 ENCOUNTER — APPOINTMENT (OUTPATIENT)
Dept: HEMATOLOGY ONCOLOGY | Facility: CLINIC | Age: 62
End: 2022-05-27
Payer: COMMERCIAL

## 2022-05-27 VITALS
OXYGEN SATURATION: 97 % | RESPIRATION RATE: 16 BRPM | SYSTOLIC BLOOD PRESSURE: 141 MMHG | WEIGHT: 144.4 LBS | TEMPERATURE: 97.5 F | BODY MASS INDEX: 28.35 KG/M2 | HEIGHT: 60 IN | HEART RATE: 85 BPM | DIASTOLIC BLOOD PRESSURE: 91 MMHG

## 2022-05-27 DIAGNOSIS — Z00.00 ENCOUNTER FOR GENERAL ADULT MEDICAL EXAMINATION W/OUT ABNORMAL FINDINGS: ICD-10-CM

## 2022-05-27 PROCEDURE — 99214 OFFICE O/P EST MOD 30 MIN: CPT

## 2022-05-27 NOTE — HISTORY OF PRESENT ILLNESS
[Disease: _____________________] : Disease: [unfilled] [T: ___] : T[unfilled] [N: ___] : N[unfilled] [AJCC Stage: ____] : AJCC Stage: [unfilled] [Treatment Protocol] : Treatment Protocol [Therapy: ___] : Therapy: [unfilled] [de-identified] : The patient presented in 2005, at the age of 45, with an abnormal screening mammogram.  Mammotome biopsy on June 8, 2005 was positive for DCIS of the left breast.  Dr. Melissa Agee performed a lumpectomy for DCIS intermediate nuclear grade with necrosis cribriform type. The DCIS was ER positive (90%) and [place] positive.  The patient received radiation therapy at [facility] under the supervision of Dr. Acacia Isaac.  The patient started tamoxifen in March 2006.\par \par In October 2009, while still on tamoxifen, the patient had an abnormal surveillance breast MRI which demonstrated an irregular mass in the contralateral (right) breast.  \par \par The patient underwent lumpectomy and sentinel lymph node biopsy performed by Dr. Melissa Agee on November 19, 2009 which demonstrated an 8 mm infiltrating mammary carcinoma with ductal and lobular features. [place] lymph node was negative. The cancer was ER positive, [place] negative and HER-2/jose francisco negative by FISH. \par \par Oncotype DX recurrence score was 40.  \par \par The patient received 4 cycles of adjuvant TC extending from January 8, 2010 through March 12, 2010.  \par \par The patient subsequently received radiation therapy under the supervision of Dr. Ignacia Temple at [facility].  \par \par The patient started anastrozole in July 2010, completed 10 years in 7/2020.\par \par Comprehensive BRACAnalysis performed on November 3, 2009 was negative for deleterious mutations. 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 Right breast infiltrating ductal and lobular carcinoma ER positive AK negative HER-2/jose francisco negative Oncotype DX recurrence score 40 [FreeTextEntry1] : Anastrozole 7/23/2010 - 7/2020 [de-identified] : \par On active surveillance.\par Second cancer was seen on MRI only. \par breast MRI 3/19/21, BI RADS3, 0.4cm rim enhancing mass within superficial lower outer quadrant of right breast , suggestive of fat necrosis, rec targeted US, in absence of sonographic correlate, should be designated as a probable benign finding and rec fu breast MRI rec in 6mos.; prominent left axillary LN likely due m recent COVID vaccination, targeted b/l axillary US rec in 3 mos in 6/20/21 (was not done)\par mammogram/breast US 8/27/2021, BI RADS 2, no evidence of malignancy. Next MRI 3/2022. \par MRI 9/30/21 showed no suspicious MRI findings in either breast, previous lesion in lower outer breast not seen on this exam, resolution of left axillary LAD, return to annual screening, BIRADS2.\par Mammo/US due in 8/2022, script in [employer]\par Last saw breast surgeon Dr Palleschi 2/8/2018, not following any further.\par Right shoulder pain with internal rotation, pain 1-2/10, referral to orthopedics given for further evaluation\par Labs reviewed from 5/9/22\par \par HEALTH MAINTENANCE\par PCP Dr. Charles Hernandez, has not seen in a while.\par eye exam 2019, no cataracts\par Had follow up colonoscopy 2021, 1 polyp benign, Dr. Felipe Bueno.\par Most recent GYN exam  5/24/21, exam OK.No vaginal bleeding or spotting.\par Saw endocrinologist Dr Emi Corral 8/31/21, receiving Prolia.\par NEURO left 5th pinky toe tingling intermittent, not worsening\par HbA1c uptrending, modifying diet with less sugars and carbohydrates\par COVID vaccine 1st dose 3/2 Pfizer.\par Grieving over sudden death of cat. \par Restarting yoga for exercise, gardening now

## 2022-05-27 NOTE — PHYSICAL EXAM
[Fully active, able to carry on all pre-disease performance without restriction] : Status 0 - Fully active, able to carry on all pre-disease performance without restriction [Normal] : affect appropriate [de-identified] : right Breast: scars along lateral margin of areola and axilla, no masses. Left breast; scar UOQ, mild hyperpigmentation, no mass.

## 2022-05-27 NOTE — ASSESSMENT
[FreeTextEntry1] : Infiltrating duct and lobular carcinoma of right breast \par     Mixed infiltrating ductal and lobular carcinoma right breast \par     Oncotype DX recurrence score 40\par     Status post lumpectomy/sentinel lymph node biopsy\par     Status post TC x4\par     Status post RT\par     Completed anastrozole tx 10 years , on active surveillance\par     Clinically CARLOS\par     up tp date w mammo/US\par     MRI yearly, next due 3/2023\par     Will continue annual surveillance with MRI because this cancer was found on MRI only. \par Intraductal carcinoma in situ of left breast \par     DCIS left breast\par     Status post lumpectomy\par     Status post RT\par     Status post tamoxifen x3 years 9 months.\par     Status post anastrozole x 10 years for a contralateral invasive breast cancer.\par     CARLOS\par     FU in 6mos then yearly\par

## 2022-05-27 NOTE — REVIEW OF SYSTEMS
[Negative] : Allergic/Immunologic [Insomnia] : no insomnia [de-identified] : \par Has ongoing rosacea, feels very subconscious about.

## 2022-10-24 ENCOUNTER — RX RENEWAL (OUTPATIENT)
Age: 62
End: 2022-10-24

## 2022-11-15 ENCOUNTER — APPOINTMENT (OUTPATIENT)
Dept: ENDOCRINOLOGY | Facility: CLINIC | Age: 62
End: 2022-11-15

## 2022-11-15 VITALS
HEART RATE: 79 BPM | WEIGHT: 143 LBS | OXYGEN SATURATION: 99 % | SYSTOLIC BLOOD PRESSURE: 130 MMHG | DIASTOLIC BLOOD PRESSURE: 80 MMHG | HEIGHT: 60 IN | TEMPERATURE: 97.2 F | BODY MASS INDEX: 28.07 KG/M2

## 2022-11-15 DIAGNOSIS — Z23 ENCOUNTER FOR IMMUNIZATION: ICD-10-CM

## 2022-11-15 PROCEDURE — 90686 IIV4 VACC NO PRSV 0.5 ML IM: CPT

## 2022-11-15 PROCEDURE — 96401 CHEMO ANTI-NEOPL SQ/IM: CPT

## 2022-11-15 PROCEDURE — G0008: CPT

## 2022-11-15 PROCEDURE — 99214 OFFICE O/P EST MOD 30 MIN: CPT | Mod: 25

## 2022-11-15 RX ORDER — DENOSUMAB 60 MG/ML
60 INJECTION SUBCUTANEOUS
Qty: 1 | Refills: 0 | Status: COMPLETED | OUTPATIENT
Start: 2022-11-15

## 2022-11-15 RX ADMIN — DENOSUMAB 0 MG/ML: 60 INJECTION SUBCUTANEOUS at 00:00

## 2022-11-15 NOTE — HISTORY OF PRESENT ILLNESS
[FreeTextEntry1] : cc: osteoporosis\par \par 62 year old woman with history of breast cancer, previously on anastrozole, with osteoporosis.  Anastrozole completed in July 2020. She was  treated with bisphosphonates in the past for about 3 years, had difficulty with adherence, switched to Prolia, first dose was 6/2017.   She consumes about 1-2 serving dairy daily, has not been taking supplemental calcium.  She has regular dental follow up, reports no thigh pain.  She reports no recent falls or fractures. \par \par Also with prediabetes-  Has started yoga again and walking a little\par \par \par \par \par \par

## 2022-11-15 NOTE — ASSESSMENT
[Denosumab Therapy] : Risks  and benefits of denosumab therapy were discussed with the patient including eczema, cellulitis, osteonecrosis of the jaw and atypical femur fractures [FreeTextEntry1] : 62 year old woman  previously on anastrozole with osteoporosis, treated with  bisphosphonates x 3 years, then switched to prolia 6/2017\par  - DXA 5/2021 with improved bone density in hip and spine\par   - continue prolia, administered today without complication [source: [facility]]\par   - Repeat DXA in  6 months\par  - Check cmp\par \par Prediabetes -  Encouraged pt to  continue with exercise \par  - Check hba1c\par \par \par \par f/u in  6 months\par \par

## 2022-11-15 NOTE — PHYSICAL EXAM
[Alert] : alert [Healthy Appearance] : healthy appearance [No Acute Distress] : no acute distress [Normal Sclera/Conjunctiva] : normal sclera/conjunctiva [No Proptosis] : no proptosis [No LAD] : no lymphadenopathy [Thyroid Not Enlarged] : the thyroid was not enlarged [No Respiratory Distress] : no respiratory distress [Clear to Auscultation] : lungs were clear to auscultation bilaterally [Normal S1, S2] : normal S1 and S2 [Regular Rhythm] : with a regular rhythm [No Edema] : no peripheral edema [Normal Bowel Sounds] : normal bowel sounds [Not Tender] : non-tender [Soft] : abdomen soft [No Spinal Tenderness] : no spinal tenderness [Kyphosis] : no kyphosis present [No Involuntary Movements] : no involuntary movements were seen [Normal Strength/Tone] : muscle strength and tone were normal [No Tremors] : no tremors [Normal Sensation on Monofilament Testing] : normal sensation on monofilament testing of lower extremities [Normal Affect] : the affect was normal [Normal Mood] : the mood was normal

## 2022-11-16 LAB
ALBUMIN SERPL ELPH-MCNC: 4.7 G/DL
ALP BLD-CCNC: 64 U/L
ALT SERPL-CCNC: 30 U/L
ANION GAP SERPL CALC-SCNC: 14 MMOL/L
AST SERPL-CCNC: 18 U/L
BILIRUB SERPL-MCNC: 0.4 MG/DL
BUN SERPL-MCNC: 16 MG/DL
CALCIUM SERPL-MCNC: 9.7 MG/DL
CHLORIDE SERPL-SCNC: 102 MMOL/L
CO2 SERPL-SCNC: 25 MMOL/L
CREAT SERPL-MCNC: 0.91 MG/DL
EGFR: 70 ML/MIN/1.73M2
ESTIMATED AVERAGE GLUCOSE: 131 MG/DL
GLUCOSE SERPL-MCNC: 98 MG/DL
HBA1C MFR BLD HPLC: 6.2 %
POTASSIUM SERPL-SCNC: 4.4 MMOL/L
PROT SERPL-MCNC: 6.8 G/DL
SODIUM SERPL-SCNC: 140 MMOL/L

## 2022-11-23 ENCOUNTER — NON-APPOINTMENT (OUTPATIENT)
Age: 62
End: 2022-11-23

## 2023-01-22 ENCOUNTER — OUTPATIENT (OUTPATIENT)
Dept: OUTPATIENT SERVICES | Facility: HOSPITAL | Age: 63
LOS: 1 days | Discharge: HOME | End: 2023-01-22

## 2023-01-22 DIAGNOSIS — M81.0 AGE-RELATED OSTEOPOROSIS WITHOUT CURRENT PATHOLOGICAL FRACTURE: ICD-10-CM

## 2023-01-22 DIAGNOSIS — C50.912 MALIGNANT NEOPLASM OF UNSPECIFIED SITE OF LEFT FEMALE BREAST: ICD-10-CM

## 2023-01-22 DIAGNOSIS — D05.12 INTRADUCTAL CARCINOMA IN SITU OF LEFT BREAST: ICD-10-CM

## 2023-01-27 ENCOUNTER — APPOINTMENT (OUTPATIENT)
Dept: HEMATOLOGY ONCOLOGY | Facility: CLINIC | Age: 63
End: 2023-01-27

## 2023-03-26 ENCOUNTER — OUTPATIENT (OUTPATIENT)
Dept: OUTPATIENT SERVICES | Facility: HOSPITAL | Age: 63
LOS: 1 days | Discharge: HOME | End: 2023-03-26

## 2023-03-26 DIAGNOSIS — C50.912 MALIGNANT NEOPLASM OF UNSPECIFIED SITE OF LEFT FEMALE BREAST: ICD-10-CM

## 2023-03-26 DIAGNOSIS — D05.12 INTRADUCTAL CARCINOMA IN SITU OF LEFT BREAST: ICD-10-CM

## 2023-03-26 DIAGNOSIS — M81.0 AGE-RELATED OSTEOPOROSIS WITHOUT CURRENT PATHOLOGICAL FRACTURE: ICD-10-CM

## 2023-03-30 ENCOUNTER — APPOINTMENT (OUTPATIENT)
Dept: HEMATOLOGY ONCOLOGY | Facility: CLINIC | Age: 63
End: 2023-03-30

## 2023-05-25 ENCOUNTER — APPOINTMENT (OUTPATIENT)
Dept: HEMATOLOGY ONCOLOGY | Facility: CLINIC | Age: 63
End: 2023-05-25
Payer: COMMERCIAL

## 2023-05-25 VITALS
OXYGEN SATURATION: 98 % | RESPIRATION RATE: 16 BRPM | WEIGHT: 140.43 LBS | BODY MASS INDEX: 27.43 KG/M2 | DIASTOLIC BLOOD PRESSURE: 85 MMHG | SYSTOLIC BLOOD PRESSURE: 137 MMHG | HEART RATE: 79 BPM | TEMPERATURE: 97 F

## 2023-05-25 PROCEDURE — 99213 OFFICE O/P EST LOW 20 MIN: CPT

## 2023-05-26 ENCOUNTER — NON-APPOINTMENT (OUTPATIENT)
Age: 63
End: 2023-05-26

## 2023-05-26 NOTE — REVIEW OF SYSTEMS
[Insomnia] : no insomnia [Negative] : Allergic/Immunologic [de-identified] : \par Has ongoing rosacea, feels very subconscious about.

## 2023-05-26 NOTE — HISTORY OF PRESENT ILLNESS
[Disease: _____________________] : Disease: [unfilled] [T: ___] : T[unfilled] [N: ___] : N[unfilled] [AJCC Stage: ____] : AJCC Stage: [unfilled] [de-identified] : The patient presented in 2005, at the age of 45, with an abnormal screening mammogram.  Mammotome biopsy on June 8, 2005 was positive for DCIS of the left breast.  Dr. Melissa Agee performed a lumpectomy for DCIS intermediate nuclear grade with necrosis cribriform type. The DCIS was ER positive (90%) and ID positive.  The patient received radiation therapy at [facility] under the supervision of Dr. Acacia Isaac.  The patient started tamoxifen in March 2006.\par \par In October 2009, while still on tamoxifen, the patient had an abnormal surveillance breast MRI which demonstrated an irregular mass in the contralateral (right) breast.  \par \par The patient underwent lumpectomy and sentinel lymph node biopsy performed by Dr. Melissa Agee on November 19, 2009 which demonstrated an 8 mm infiltrating mammary carcinoma with ductal and lobular features. [place] lymph node was negative. The cancer was ER positive, ID negative and HER-2/jose francisco negative by FISH. \par \par Oncotype DX recurrence score was 40.  \par \par The patient received 4 cycles of adjuvant TC extending from January 8, 2010 through March 12, 2010.  \par \par The patient subsequently received radiation therapy under the supervision of Dr. Ignacia Temple at [facility].  \par \par The patient started anastrozole in July 2010, completed 10 years in 7/2020.\par \par Comprehensive BRACAnalysis performed on November 3, 2009 was negative for deleterious mutations. [de-identified] : Left breast DCIS solid and cribriform type with focal central necrosis ER positive HI positive. Right breast infiltrating ductal and lobular carcinoma ER positive HI negative HER-2/jsoe francisco negative Oncotype DX recurrence score 40 [Treatment Protocol] : Treatment Protocol [Therapy: ___] : Therapy: [unfilled] [FreeTextEntry1] : Anastrozole 7/23/2010 - 7/2020 [de-identified] : 5/25/2023\par On active surveillance.\par Second cancer was seen on MRI only. \par Patient returns today for followup for history of breast cancer and to rule out metastatic breast cancer.\par Patient denies any breast masses, breast tenderness, skin changes or nipple discharge.\par Patient denies any SOB, CP, abdominal pain, bone pain, headache, or unexplained weight loss\par \par Imaging Summary: \par Mammo/US 9/2022 - reviewed report\par MRI Breast: 2021 - no testing done in 2022\par \par HEALTH MAINTENANCE\par PCP Dr. Charles Hernandez, has not seen in a while.\par eye exam 2019, no cataracts\par Had follow up colonoscopy 2021, 1 polyp benign, Dr. Felipe Bueno.\par Most recent GYN exam   exam OK.No vaginal bleeding or spotting.\par Saw endocrinologist Dr Emi Corral , receiving Prolia.\par NEURO left 5th pinky toe tingling intermittent, not worsening\par HbA1c uptrending, modifying diet with less sugars and carbohydrates\par COVID vaccine 1st dose 3/2 Pfizer.\par Grieving over sudden death of cat. \par Restarting yoga for exercise, gardening now

## 2023-05-26 NOTE — PHYSICAL EXAM
[Fully active, able to carry on all pre-disease performance without restriction] : Status 0 - Fully active, able to carry on all pre-disease performance without restriction [Normal] : affect appropriate [de-identified] : right Breast: scars along lateral margin of areola and axilla, no masses. Left breast; scar UOQ, mild hyperpigmentation, no mass.

## 2023-05-26 NOTE — ASSESSMENT
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 \par Intraductal carcinoma in situ of left breast \par     DCIS left breast\par     Status post lumpectomy\par     Status post RT\par     Status post tamoxifen x3 years 9 months.\par     Status post anastrozole x 10 years for a contralateral invasive breast cancer.\par     CARLOS\par     FU in 6mos then yearly\par

## 2023-06-05 ENCOUNTER — APPOINTMENT (OUTPATIENT)
Dept: ENDOCRINOLOGY | Facility: CLINIC | Age: 63
End: 2023-06-05
Payer: COMMERCIAL

## 2023-06-05 ENCOUNTER — MED ADMIN CHARGE (OUTPATIENT)
Age: 63
End: 2023-06-05

## 2023-06-05 VITALS — HEIGHT: 60.6 IN | WEIGHT: 137 LBS | BODY MASS INDEX: 26.2 KG/M2

## 2023-06-05 VITALS — OXYGEN SATURATION: 99 % | HEART RATE: 83 BPM | SYSTOLIC BLOOD PRESSURE: 120 MMHG | DIASTOLIC BLOOD PRESSURE: 80 MMHG

## 2023-06-05 PROCEDURE — ZZZZZ: CPT

## 2023-06-05 PROCEDURE — 96401 CHEMO ANTI-NEOPL SQ/IM: CPT

## 2023-06-05 PROCEDURE — 77080 DXA BONE DENSITY AXIAL: CPT

## 2023-06-05 PROCEDURE — 99214 OFFICE O/P EST MOD 30 MIN: CPT | Mod: 25

## 2023-06-05 RX ORDER — DENOSUMAB 60 MG/ML
60 INJECTION SUBCUTANEOUS
Qty: 1 | Refills: 0 | Status: COMPLETED | OUTPATIENT
Start: 2023-06-05

## 2023-06-05 RX ADMIN — DENOSUMAB 0 MG/ML: 60 INJECTION SUBCUTANEOUS at 00:00

## 2023-06-05 NOTE — ASSESSMENT
[Denosumab Therapy] : Risks  and benefits of denosumab therapy were discussed with the patient including eczema, cellulitis, osteonecrosis of the jaw and atypical femur fractures [FreeTextEntry1] : 62 year old woman  previously on anastrozole with osteoporosis, treated with  bisphosphonates x 3 years, then switched to prolia 6/2017\par  - DXA performed today, pt with slight decline in bone density in T hip and spine, though better than before prolia. \par   - Recommend continuing prolia, administered today without complication [source: [facility]]\par   - Repeat DXA in  1 year, if there is further decline, switch to alternate agent\par  - Check cmp\par  - check ctx fasting\par  - Discussed that she can proceed with [place], in generaly, safest point for dental work is midway between doses.\par \par Prediabetes -  Encouraged pt to  continue with exercise \par  - Check hba1c\par \par \par \par f/u in  6 months\par \par

## 2023-06-05 NOTE — HISTORY OF PRESENT ILLNESS
[FreeTextEntry1] : cc: osteoporosis\par \par 62 year old woman with history of breast cancer, previously on anastrozole, with osteoporosis.  Anastrozole completed in July 2020. She was  treated with bisphosphonates in the past for about 3 years, had difficulty with adherence, switched to Prolia, first dose was 6/2017. She reports no recent falls or fractures. \par  She has regular dental follow up, may  need crown replaced, reports no thigh pain.    She consumes about 1-2 serving dairy daily, has not been taking supplemental calcium or vitamin D.  \par \par \par Also with prediabetes-  Has been limiting sugar, has been doing a lot of gardening\par

## 2023-06-07 LAB
25(OH)D3 SERPL-MCNC: 32.8 NG/ML
ALBUMIN SERPL ELPH-MCNC: 4.8 G/DL
ALP BLD-CCNC: 66 U/L
ALT SERPL-CCNC: 25 U/L
ANION GAP SERPL CALC-SCNC: 12 MMOL/L
AST SERPL-CCNC: 17 U/L
BILIRUB SERPL-MCNC: 0.4 MG/DL
BUN SERPL-MCNC: 16 MG/DL
CALCIUM SERPL-MCNC: 10 MG/DL
CALCIUM SERPL-MCNC: 10 MG/DL
CHLORIDE SERPL-SCNC: 102 MMOL/L
CO2 SERPL-SCNC: 27 MMOL/L
CREAT SERPL-MCNC: 0.81 MG/DL
EGFR: 81 ML/MIN/1.73M2
ESTIMATED AVERAGE GLUCOSE: 126 MG/DL
GLUCOSE SERPL-MCNC: 109 MG/DL
HBA1C MFR BLD HPLC: 6 %
PARATHYROID HORMONE INTACT: 43 PG/ML
POTASSIUM SERPL-SCNC: 4.6 MMOL/L
PROT SERPL-MCNC: 7.2 G/DL
SODIUM SERPL-SCNC: 141 MMOL/L

## 2023-06-08 ENCOUNTER — NON-APPOINTMENT (OUTPATIENT)
Age: 63
End: 2023-06-08

## 2023-06-20 LAB — COLLAGEN CTX SERPL-MCNC: 99 PG/ML

## 2023-09-01 DIAGNOSIS — R92.8 OTHER ABNORMAL AND INCONCLUSIVE FINDINGS ON DIAGNOSTIC IMAGING OF BREAST: ICD-10-CM

## 2023-10-05 ENCOUNTER — NON-APPOINTMENT (OUTPATIENT)
Age: 63
End: 2023-10-05

## 2023-11-02 ENCOUNTER — NON-APPOINTMENT (OUTPATIENT)
Age: 63
End: 2023-11-02

## 2023-11-03 ENCOUNTER — APPOINTMENT (OUTPATIENT)
Dept: ORTHOPEDIC SURGERY | Facility: CLINIC | Age: 63
End: 2023-11-03
Payer: COMMERCIAL

## 2023-11-03 ENCOUNTER — NON-APPOINTMENT (OUTPATIENT)
Age: 63
End: 2023-11-03

## 2023-11-03 VITALS
HEIGHT: 61 IN | DIASTOLIC BLOOD PRESSURE: 75 MMHG | HEART RATE: 84 BPM | WEIGHT: 137 LBS | SYSTOLIC BLOOD PRESSURE: 132 MMHG | TEMPERATURE: 97.3 F | OXYGEN SATURATION: 97 % | BODY MASS INDEX: 25.86 KG/M2

## 2023-11-03 DIAGNOSIS — M54.2 CERVICALGIA: ICD-10-CM

## 2023-11-03 DIAGNOSIS — M50.90 CERVICAL DISC DISORDER, UNSPECIFIED, UNSPECIFIED CERVICAL REGION: ICD-10-CM

## 2023-11-03 PROCEDURE — 99203 OFFICE O/P NEW LOW 30 MIN: CPT

## 2023-11-03 PROCEDURE — 72040 X-RAY EXAM NECK SPINE 2-3 VW: CPT

## 2023-11-07 ENCOUNTER — RX RENEWAL (OUTPATIENT)
Age: 63
End: 2023-11-07

## 2023-12-12 ENCOUNTER — APPOINTMENT (OUTPATIENT)
Dept: ENDOCRINOLOGY | Facility: CLINIC | Age: 63
End: 2023-12-12
Payer: COMMERCIAL

## 2023-12-12 VITALS
BODY MASS INDEX: 27.75 KG/M2 | DIASTOLIC BLOOD PRESSURE: 80 MMHG | HEIGHT: 61 IN | HEART RATE: 90 BPM | WEIGHT: 147 LBS | OXYGEN SATURATION: 96 % | SYSTOLIC BLOOD PRESSURE: 126 MMHG

## 2023-12-12 PROCEDURE — 96401 CHEMO ANTI-NEOPL SQ/IM: CPT

## 2023-12-12 PROCEDURE — 99214 OFFICE O/P EST MOD 30 MIN: CPT | Mod: 25

## 2023-12-12 RX ORDER — DENOSUMAB 60 MG/ML
60 INJECTION SUBCUTANEOUS
Qty: 1 | Refills: 0 | Status: COMPLETED | OUTPATIENT
Start: 2023-12-12

## 2023-12-12 RX ADMIN — DENOSUMAB 0 MG/ML: 60 INJECTION SUBCUTANEOUS at 00:00

## 2023-12-20 ENCOUNTER — APPOINTMENT (OUTPATIENT)
Dept: ULTRASOUND IMAGING | Facility: CLINIC | Age: 63
End: 2023-12-20
Payer: COMMERCIAL

## 2023-12-20 PROCEDURE — 76536 US EXAM OF HEAD AND NECK: CPT

## 2024-05-10 ENCOUNTER — RX RENEWAL (OUTPATIENT)
Age: 64
End: 2024-05-10

## 2024-05-10 RX ORDER — DENOSUMAB 60 MG/ML
60 INJECTION SUBCUTANEOUS
Qty: 1 | Refills: 0 | Status: ACTIVE | COMMUNITY
Start: 2019-07-15 | End: 1900-01-01

## 2024-05-16 ENCOUNTER — OUTPATIENT (OUTPATIENT)
Dept: OUTPATIENT SERVICES | Facility: HOSPITAL | Age: 64
LOS: 1 days | Discharge: HOME | End: 2024-05-16

## 2024-05-16 DIAGNOSIS — M81.0 AGE-RELATED OSTEOPOROSIS WITHOUT CURRENT PATHOLOGICAL FRACTURE: ICD-10-CM

## 2024-05-16 DIAGNOSIS — C50.911 MALIGNANT NEOPLASM OF UNSPECIFIED SITE OF RIGHT FEMALE BREAST: ICD-10-CM

## 2024-05-22 ENCOUNTER — NON-APPOINTMENT (OUTPATIENT)
Age: 64
End: 2024-05-22

## 2024-06-18 ENCOUNTER — APPOINTMENT (OUTPATIENT)
Dept: ENDOCRINOLOGY | Facility: CLINIC | Age: 64
End: 2024-06-18
Payer: COMMERCIAL

## 2024-06-18 ENCOUNTER — APPOINTMENT (OUTPATIENT)
Dept: HEMATOLOGY ONCOLOGY | Facility: CLINIC | Age: 64
End: 2024-06-18
Payer: COMMERCIAL

## 2024-06-18 VITALS — WEIGHT: 146 LBS | HEIGHT: 60.6 IN | BODY MASS INDEX: 27.92 KG/M2

## 2024-06-18 VITALS — DIASTOLIC BLOOD PRESSURE: 90 MMHG | OXYGEN SATURATION: 98 % | SYSTOLIC BLOOD PRESSURE: 120 MMHG | HEART RATE: 109 BPM

## 2024-06-18 VITALS
BODY MASS INDEX: 27.58 KG/M2 | RESPIRATION RATE: 16 BRPM | DIASTOLIC BLOOD PRESSURE: 79 MMHG | OXYGEN SATURATION: 5 % | SYSTOLIC BLOOD PRESSURE: 124 MMHG | TEMPERATURE: 97.5 F | WEIGHT: 145.94 LBS | HEART RATE: 83 BPM

## 2024-06-18 DIAGNOSIS — R73.03 PREDIABETES.: ICD-10-CM

## 2024-06-18 DIAGNOSIS — C50.911 MALIGNANT NEOPLASM OF UNSPECIFIED SITE OF RIGHT FEMALE BREAST: ICD-10-CM

## 2024-06-18 DIAGNOSIS — M81.0 AGE-RELATED OSTEOPOROSIS W/OUT CURRENT PATHOLOGICAL FRACTURE: ICD-10-CM

## 2024-06-18 DIAGNOSIS — E04.1 NONTOXIC SINGLE THYROID NODULE: ICD-10-CM

## 2024-06-18 DIAGNOSIS — M50.90 CERVICAL DISC DISORDER, UNSPECIFIED, UNSPECIFIED CERVICAL REGION: ICD-10-CM

## 2024-06-18 PROCEDURE — 77080 DXA BONE DENSITY AXIAL: CPT | Mod: NC

## 2024-06-18 PROCEDURE — 99214 OFFICE O/P EST MOD 30 MIN: CPT | Mod: 25

## 2024-06-18 PROCEDURE — ZZZZZ: CPT

## 2024-06-18 PROCEDURE — 96401 CHEMO ANTI-NEOPL SQ/IM: CPT

## 2024-06-18 PROCEDURE — 99213 OFFICE O/P EST LOW 20 MIN: CPT

## 2024-06-18 PROCEDURE — G2211 COMPLEX E/M VISIT ADD ON: CPT | Mod: NC

## 2024-06-18 RX ORDER — DENOSUMAB 60 MG/ML
60 INJECTION SUBCUTANEOUS
Qty: 1 | Refills: 0 | Status: COMPLETED | OUTPATIENT
Start: 2024-06-18

## 2024-06-18 RX ORDER — ROSUVASTATIN CALCIUM 10 MG/1
10 TABLET, FILM COATED ORAL
Qty: 30 | Refills: 0 | Status: ACTIVE | COMMUNITY
Start: 2024-06-18

## 2024-06-18 RX ADMIN — DENOSUMAB 0 MG/ML: 60 INJECTION SUBCUTANEOUS at 00:00

## 2024-06-18 NOTE — PHYSICAL EXAM
[Alert] : alert [Healthy Appearance] : healthy appearance [No Acute Distress] : no acute distress [Normal Sclera/Conjunctiva] : normal sclera/conjunctiva [No Proptosis] : no proptosis [No LAD] : no lymphadenopathy [Thyroid Not Enlarged] : the thyroid was not enlarged [No Respiratory Distress] : no respiratory distress [Clear to Auscultation] : lungs were clear to auscultation bilaterally [Normal S1, S2] : normal S1 and S2 [Regular Rhythm] : with a regular rhythm [No Edema] : no peripheral edema [Normal Bowel Sounds] : normal bowel sounds [Not Tender] : non-tender [Soft] : abdomen soft [No Spinal Tenderness] : no spinal tenderness [No Involuntary Movements] : no involuntary movements were seen [Normal Strength/Tone] : muscle strength and tone were normal [Normal Reflexes] : deep tendon reflexes were 2+ and symmetric [No Tremors] : no tremors [Normal Affect] : the affect was normal [Normal Mood] : the mood was normal [Kyphosis] : no kyphosis present [de-identified] : right nodule ~ 2 cm

## 2024-06-18 NOTE — ASSESSMENT
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  Intraductal carcinoma in situ of left breast      DCIS left breast     Status post lumpectomy     Status post RT     Status post tamoxifen x3 years 9 months.     Status post anastrozole x 10 years for a contralateral invasive breast cancer.     CARLOS     FU one year  R/O Cervical radiculopathy - MRI C-spine ordered without contrast

## 2024-06-18 NOTE — PHYSICAL EXAM
[Fully active, able to carry on all pre-disease performance without restriction] : Status 0 - Fully active, able to carry on all pre-disease performance without restriction [Normal] : normal spine exam without palpable tenderness, no kyphosis or scoliosis [de-identified] : right Breast: scars along lateral margin of areola and axilla, no masses. Left breast; scar UOQ, mild hyperpigmentation, no mass.

## 2024-06-18 NOTE — HISTORY OF PRESENT ILLNESS
[FreeTextEntry1] : cc: osteoporosis  64 year old woman with history of breast cancer, previously on anastrozole, with osteoporosis.  Anastrozole completed in July 2020. She was  treated with bisphosphonates in the past for about 3 years, had difficulty with adherence, switched to Prolia, first dose was 6/2017. She reports no recent falls or fractures. She consumes about 1-2 serving dairy daily, does not take supplemental calcium takes vitamin D.  She has regular dental care, reports no thigh pain.      Also with prediabetes-  Will be retiring soon and intends to increase exercise.  Has been limiting sugar intake.  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  Will be having MRI

## 2024-06-18 NOTE — ASSESSMENT
[FreeTextEntry1] : -- 63 year old woman previously on anastrozole with osteoporosis, treated with bisphosphonates x 3 years, then switched to prolia 6/2017  - DXA performed today, bone density improved.  Plan to transition off Prolia to Reclast.  Discussed risks benefits and alternatives Prolia administered today without complication [source: [facility]]' - Plan for Reclast in 6 months and repeat dxa after one year  - Check cmp   Prediabetes - Encouraged pt to continue with exercise and resume diet - check A1c  Thyroid nodule - repeat thyroid ultrasound to monitor for change  f/u in one year

## 2024-06-18 NOTE — HISTORY OF PRESENT ILLNESS
[Disease: _____________________] : Disease: [unfilled] [T: ___] : T[unfilled] [N: ___] : N[unfilled] [AJCC Stage: ____] : AJCC Stage: [unfilled] [Treatment Protocol] : Treatment Protocol [Therapy: ___] : Therapy: [unfilled] [de-identified] : The patient presented in 2005, at the age of 45, with an abnormal screening mammogram.  Mammotome biopsy on June 8, 2005 was positive for DCIS of the left breast.  Dr. Melissa Agee performed a lumpectomy for DCIS intermediate nuclear grade with necrosis cribriform type. The DCIS was ER positive (90%) and KS positive.  The patient received radiation therapy at [facility] under the supervision of Dr. Acacia Isaac.  The patient started tamoxifen in March 2006.\par  \par  In October 2009, while still on tamoxifen, the patient had an abnormal surveillance breast MRI which demonstrated an irregular mass in the contralateral (right) breast.  \par  \par  The patient underwent lumpectomy and sentinel lymph node biopsy performed by Dr. Melissa Agee on November 19, 2009 which demonstrated an 8 mm infiltrating mammary carcinoma with ductal and lobular features. [place] lymph node was negative. The cancer was ER positive, [place] negative and HER-2/jose francisco negative by FISH. \par  \par  Oncotype DX recurrence score was 40.  \par  \par  The patient received 4 cycles of adjuvant TC extending from January 8, 2010 through March 12, 2010.  \par  \par  The patient subsequently received radiation therapy under the supervision of Dr. Ignacia Temple at [facility].  \par  \par  The patient started anastrozole in July 2010, completed 10 years in 7/2020.\par  \par  Comprehensive BRACAnalysis performed on November 3, 2009 was negative for deleterious mutations. 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 Right breast infiltrating ductal and lobular carcinoma ER positive AK negative HER-2/jose francisco negative Oncotype DX recurrence score 40 [FreeTextEntry1] : Anastrozole 7/23/2010 - 7/2020 [de-identified] : 6/18/2024 On active surveillance. Second cancer was seen on MRI only.  Patient returns today for followup for history of breast cancer and to rule out metastatic breast cancer. Patient denies any breast masses, breast tenderness, skin changes or nipple discharge. Patient denies any SOB, CP, abdominal pain, bone pain, headache, or unexplained weight loss c/o neck discomfort radiating into shoulder rx months; did PT with some benefit; does not warrant pain medication.  Imaging Summary:  Mammo/US 10/2023 - reviewed report MRI Breast: 2023 - no testing done in 2022  HEALTH MAINTENANCE PCP Dr. Charles Hernandez, has not seen in a while. eye exam 2019, no cataracts Had follow up colonoscopy 2021, 1 polyp benign, Dr. Felipe Bueno. Most recent GYN exam   exam OK.No vaginal bleeding or spotting. Saw endocrinologist Dr Emi Corral , receiving Prolia. NEURO left 5th pinky toe tingling intermittent, not worsening HbA1c uptrending, modifying diet with less sugars and carbohydrates Retiring June 2024

## 2024-06-19 LAB
ALBUMIN SERPL ELPH-MCNC: 4.7 G/DL
ALP BLD-CCNC: 58 U/L
ALT SERPL-CCNC: 32 U/L
ANION GAP SERPL CALC-SCNC: 14 MMOL/L
AST SERPL-CCNC: 16 U/L
BILIRUB SERPL-MCNC: 0.3 MG/DL
BUN SERPL-MCNC: 21 MG/DL
CALCIUM SERPL-MCNC: 9.9 MG/DL
CHLORIDE SERPL-SCNC: 104 MMOL/L
CO2 SERPL-SCNC: 25 MMOL/L
CREAT SERPL-MCNC: 0.97 MG/DL
EGFR: 64 ML/MIN/1.73M2
ESTIMATED AVERAGE GLUCOSE: 137 MG/DL
GLUCOSE SERPL-MCNC: 152 MG/DL
HBA1C MFR BLD HPLC: 6.4 %
POTASSIUM SERPL-SCNC: 4.3 MMOL/L
PROT SERPL-MCNC: 7 G/DL
SODIUM SERPL-SCNC: 144 MMOL/L

## 2024-07-02 ENCOUNTER — APPOINTMENT (OUTPATIENT)
Dept: ENDOCRINOLOGY | Facility: CLINIC | Age: 64
End: 2024-07-02
Payer: COMMERCIAL

## 2024-07-02 VITALS — HEIGHT: 60 IN | WEIGHT: 144 LBS | BODY MASS INDEX: 28.27 KG/M2

## 2024-07-02 PROCEDURE — 97804 MEDICAL NUTRITION GROUP: CPT

## 2024-07-08 ENCOUNTER — OUTPATIENT (OUTPATIENT)
Dept: OUTPATIENT SERVICES | Facility: HOSPITAL | Age: 64
LOS: 1 days | End: 2024-07-08
Payer: COMMERCIAL

## 2024-07-08 ENCOUNTER — APPOINTMENT (OUTPATIENT)
Dept: MRI IMAGING | Facility: CLINIC | Age: 64
End: 2024-07-08
Payer: COMMERCIAL

## 2024-07-08 DIAGNOSIS — M50.90 CERVICAL DISC DISORDER, UNSPECIFIED, UNSPECIFIED CERVICAL REGION: ICD-10-CM

## 2024-07-08 PROCEDURE — 72141 MRI NECK SPINE W/O DYE: CPT | Mod: 26

## 2024-07-08 PROCEDURE — 72141 MRI NECK SPINE W/O DYE: CPT

## 2024-07-10 ENCOUNTER — OUTPATIENT (OUTPATIENT)
Dept: OUTPATIENT SERVICES | Facility: HOSPITAL | Age: 64
LOS: 1 days | End: 2024-07-10
Payer: COMMERCIAL

## 2024-07-10 ENCOUNTER — APPOINTMENT (OUTPATIENT)
Dept: ULTRASOUND IMAGING | Facility: CLINIC | Age: 64
End: 2024-07-10
Payer: COMMERCIAL

## 2024-07-10 DIAGNOSIS — Z00.8 ENCOUNTER FOR OTHER GENERAL EXAMINATION: ICD-10-CM

## 2024-07-10 DIAGNOSIS — E04.1 NONTOXIC SINGLE THYROID NODULE: ICD-10-CM

## 2024-07-10 PROCEDURE — 76536 US EXAM OF HEAD AND NECK: CPT | Mod: 26

## 2024-07-10 PROCEDURE — 76536 US EXAM OF HEAD AND NECK: CPT

## 2024-07-15 ENCOUNTER — NON-APPOINTMENT (OUTPATIENT)
Age: 64
End: 2024-07-15

## 2024-08-01 ENCOUNTER — APPOINTMENT (OUTPATIENT)
Dept: ENDOCRINOLOGY | Facility: CLINIC | Age: 64
End: 2024-08-01
Payer: COMMERCIAL

## 2024-08-01 VITALS — WEIGHT: 140 LBS | HEIGHT: 60 IN | BODY MASS INDEX: 27.48 KG/M2

## 2024-08-01 PROCEDURE — 97803 MED NUTRITION INDIV SUBSEQ: CPT

## 2024-08-20 ENCOUNTER — APPOINTMENT (OUTPATIENT)
Dept: OPHTHALMOLOGY | Facility: CLINIC | Age: 64
End: 2024-08-20
Payer: COMMERCIAL

## 2024-08-20 ENCOUNTER — NON-APPOINTMENT (OUTPATIENT)
Age: 64
End: 2024-08-20

## 2024-08-20 PROCEDURE — 92014 COMPRE OPH EXAM EST PT 1/>: CPT

## 2024-08-20 PROCEDURE — 92004 COMPRE OPH EXAM NEW PT 1/>: CPT

## 2024-09-03 ENCOUNTER — NON-APPOINTMENT (OUTPATIENT)
Age: 64
End: 2024-09-03

## 2024-09-04 ENCOUNTER — APPOINTMENT (OUTPATIENT)
Dept: RHEUMATOLOGY | Facility: CLINIC | Age: 64
End: 2024-09-04
Payer: COMMERCIAL

## 2024-09-04 VITALS
SYSTOLIC BLOOD PRESSURE: 120 MMHG | RESPIRATION RATE: 16 BRPM | TEMPERATURE: 97.5 F | HEART RATE: 74 BPM | OXYGEN SATURATION: 95 % | DIASTOLIC BLOOD PRESSURE: 74 MMHG

## 2024-09-04 VITALS
SYSTOLIC BLOOD PRESSURE: 112 MMHG | HEART RATE: 72 BPM | DIASTOLIC BLOOD PRESSURE: 85 MMHG | RESPIRATION RATE: 16 BRPM | OXYGEN SATURATION: 95 %

## 2024-09-04 PROCEDURE — 96374 THER/PROPH/DIAG INJ IV PUSH: CPT

## 2024-09-04 NOTE — HISTORY OF PRESENT ILLNESS
[Denies] : Denies [No] : No [N/A] : N/A [Yes] : Yes [Left upper extremity] : Left upper extremity [24g] : 24g [Start Time: ___] : Medication Start Time: [unfilled] [End Time: ___] : Medication End Time: [unfilled] [Medication Name: ___] : Medication Name: [unfilled] [Total Amount Administered: ___] : Total Amount Administered: [unfilled] [IV discontinued. Intact. No signs or symptoms of IV complications noted. Time: ___] : IV discontinued. Intact. No signs or symptoms of IV complications noted. Time: [unfilled] [Patient  instructed to seek medical attention with signs and symptoms of adverse effects] : Patient  instructed to seek medical attention with signs and symptoms of adverse effects [Patient left unit in no acute distress] : Patient left unit in no acute distress [Medications administered as ordered and tolerated well.] : Medications administered as ordered and tolerated well. [de-identified] : median cubital vein  [de-identified] : Patient presents for Zoledronic Acid infusion, doing well overall. Patient given discharge instructions, verbalized understanding and tolerated infusion well.

## 2024-09-19 ENCOUNTER — APPOINTMENT (OUTPATIENT)
Dept: ENDOCRINOLOGY | Facility: CLINIC | Age: 64
End: 2024-09-19
Payer: COMMERCIAL

## 2024-09-19 ENCOUNTER — RESULT CHARGE (OUTPATIENT)
Age: 64
End: 2024-09-19

## 2024-09-19 VITALS — BODY MASS INDEX: 25.86 KG/M2 | WEIGHT: 137 LBS | HEIGHT: 61 IN

## 2024-09-19 LAB — HBA1C MFR BLD HPLC: 6

## 2024-09-19 PROCEDURE — 97803 MED NUTRITION INDIV SUBSEQ: CPT

## 2024-10-18 ENCOUNTER — RESULT REVIEW (OUTPATIENT)
Age: 64
End: 2024-10-18

## 2024-10-18 ENCOUNTER — APPOINTMENT (OUTPATIENT)
Dept: MAMMOGRAPHY | Facility: CLINIC | Age: 64
End: 2024-10-18
Payer: COMMERCIAL

## 2024-10-18 ENCOUNTER — APPOINTMENT (OUTPATIENT)
Dept: ULTRASOUND IMAGING | Facility: CLINIC | Age: 64
End: 2024-10-18
Payer: COMMERCIAL

## 2024-10-18 PROCEDURE — 77063 BREAST TOMOSYNTHESIS BI: CPT

## 2024-10-18 PROCEDURE — 77067 SCR MAMMO BI INCL CAD: CPT

## 2024-10-18 PROCEDURE — 76641 ULTRASOUND BREAST COMPLETE: CPT | Mod: 50

## 2024-10-22 ENCOUNTER — APPOINTMENT (OUTPATIENT)
Dept: ENDOCRINOLOGY | Facility: CLINIC | Age: 64
End: 2024-10-22

## 2024-11-01 ENCOUNTER — RX RENEWAL (OUTPATIENT)
Age: 64
End: 2024-11-01

## 2025-02-04 ENCOUNTER — APPOINTMENT (OUTPATIENT)
Facility: CLINIC | Age: 65
End: 2025-02-04
Payer: MEDICARE

## 2025-02-04 VITALS
HEART RATE: 93 BPM | DIASTOLIC BLOOD PRESSURE: 71 MMHG | HEIGHT: 61 IN | SYSTOLIC BLOOD PRESSURE: 129 MMHG | BODY MASS INDEX: 25.86 KG/M2 | WEIGHT: 137 LBS

## 2025-02-04 DIAGNOSIS — E04.1 NONTOXIC SINGLE THYROID NODULE: ICD-10-CM

## 2025-02-04 DIAGNOSIS — R73.03 PREDIABETES.: ICD-10-CM

## 2025-02-04 DIAGNOSIS — M81.0 AGE-RELATED OSTEOPOROSIS W/OUT CURRENT PATHOLOGICAL FRACTURE: ICD-10-CM

## 2025-02-04 PROCEDURE — 99204 OFFICE O/P NEW MOD 45 MIN: CPT

## 2025-02-04 PROCEDURE — G2211 COMPLEX E/M VISIT ADD ON: CPT

## 2025-02-05 LAB
ANION GAP SERPL CALC-SCNC: 12 MMOL/L
BUN SERPL-MCNC: 17 MG/DL
CALCIUM SERPL-MCNC: 9.8 MG/DL
CHLORIDE SERPL-SCNC: 103 MMOL/L
CO2 SERPL-SCNC: 28 MMOL/L
CREAT SERPL-MCNC: 0.88 MG/DL
EGFR: 72 ML/MIN/1.73M2
ESTIMATED AVERAGE GLUCOSE: 134 MG/DL
GLUCOSE SERPL-MCNC: 149 MG/DL
HBA1C MFR BLD HPLC: 6.3 %
POTASSIUM SERPL-SCNC: 4 MMOL/L
SODIUM SERPL-SCNC: 142 MMOL/L

## 2025-06-09 ENCOUNTER — APPOINTMENT (OUTPATIENT)
Dept: OBGYN | Facility: CLINIC | Age: 65
End: 2025-06-09

## 2025-06-28 ENCOUNTER — OUTPATIENT (OUTPATIENT)
Dept: OUTPATIENT SERVICES | Facility: HOSPITAL | Age: 65
LOS: 1 days | Discharge: HOME | End: 2025-06-28

## 2025-06-28 DIAGNOSIS — C50.912 MALIGNANT NEOPLASM OF UNSPECIFIED SITE OF LEFT FEMALE BREAST: ICD-10-CM

## 2025-07-03 ENCOUNTER — APPOINTMENT (OUTPATIENT)
Dept: HEMATOLOGY ONCOLOGY | Facility: CLINIC | Age: 65
End: 2025-07-03
Payer: MEDICARE

## 2025-07-03 VITALS
DIASTOLIC BLOOD PRESSURE: 80 MMHG | BODY MASS INDEX: 26.41 KG/M2 | HEART RATE: 82 BPM | RESPIRATION RATE: 16 BRPM | TEMPERATURE: 97.3 F | WEIGHT: 139.77 LBS | SYSTOLIC BLOOD PRESSURE: 136 MMHG | OXYGEN SATURATION: 96 %

## 2025-07-03 PROCEDURE — 99203 OFFICE O/P NEW LOW 30 MIN: CPT

## 2025-07-16 ENCOUNTER — APPOINTMENT (OUTPATIENT)
Dept: MRI IMAGING | Facility: IMAGING CENTER | Age: 65
End: 2025-07-16

## 2025-07-22 ENCOUNTER — OUTPATIENT (OUTPATIENT)
Dept: OUTPATIENT SERVICES | Facility: HOSPITAL | Age: 65
LOS: 1 days | End: 2025-07-22
Payer: MEDICARE

## 2025-07-22 ENCOUNTER — APPOINTMENT (OUTPATIENT)
Dept: MRI IMAGING | Facility: IMAGING CENTER | Age: 65
End: 2025-07-22
Payer: MEDICARE

## 2025-07-22 DIAGNOSIS — C50.911 MALIGNANT NEOPLASM OF UNSPECIFIED SITE OF RIGHT FEMALE BREAST: ICD-10-CM

## 2025-07-22 PROCEDURE — 77049 MRI BREAST C-+ W/CAD BI: CPT | Mod: 26

## 2025-07-22 PROCEDURE — C8908: CPT

## 2025-07-22 PROCEDURE — A9585: CPT

## 2025-07-22 PROCEDURE — C8937: CPT

## 2025-08-28 ENCOUNTER — APPOINTMENT (OUTPATIENT)
Dept: ENDOCRINOLOGY | Facility: CLINIC | Age: 65
End: 2025-08-28
Payer: MEDICARE

## 2025-08-28 DIAGNOSIS — M81.0 AGE-RELATED OSTEOPOROSIS W/OUT CURRENT PATHOLOGICAL FRACTURE: ICD-10-CM

## 2025-08-28 PROCEDURE — 77089 TBS DXA CAL W/I&R FX RISK: CPT | Mod: GA

## 2025-08-28 PROCEDURE — 77080 DXA BONE DENSITY AXIAL: CPT | Mod: GA
